# Patient Record
Sex: MALE | Race: BLACK OR AFRICAN AMERICAN | NOT HISPANIC OR LATINO | URBAN - METROPOLITAN AREA
[De-identification: names, ages, dates, MRNs, and addresses within clinical notes are randomized per-mention and may not be internally consistent; named-entity substitution may affect disease eponyms.]

---

## 2022-09-12 ENCOUNTER — EMERGENCY (EMERGENCY)
Facility: HOSPITAL | Age: 62
LOS: 1 days | Discharge: HOME | End: 2022-09-12
Admitting: EMERGENCY MEDICINE

## 2022-09-12 VITALS
SYSTOLIC BLOOD PRESSURE: 133 MMHG | HEIGHT: 72 IN | OXYGEN SATURATION: 96 % | HEART RATE: 71 BPM | WEIGHT: 227.96 LBS | TEMPERATURE: 98 F | DIASTOLIC BLOOD PRESSURE: 83 MMHG | RESPIRATION RATE: 16 BRPM

## 2022-09-12 VITALS
TEMPERATURE: 98 F | RESPIRATION RATE: 16 BRPM | DIASTOLIC BLOOD PRESSURE: 82 MMHG | HEART RATE: 51 BPM | OXYGEN SATURATION: 100 % | SYSTOLIC BLOOD PRESSURE: 128 MMHG

## 2022-09-12 LAB
ALBUMIN SERPL ELPH-MCNC: 3.9 G/DL — SIGNIFICANT CHANGE UP (ref 3.4–5)
ALP SERPL-CCNC: 65 U/L — SIGNIFICANT CHANGE UP (ref 40–120)
ALT FLD-CCNC: 27 U/L — SIGNIFICANT CHANGE UP (ref 12–42)
ANION GAP SERPL CALC-SCNC: 6 MMOL/L — LOW (ref 9–16)
APPEARANCE UR: CLEAR — SIGNIFICANT CHANGE UP
AST SERPL-CCNC: 19 U/L — SIGNIFICANT CHANGE UP (ref 15–37)
BASOPHILS # BLD AUTO: 0.02 K/UL — SIGNIFICANT CHANGE UP (ref 0–0.2)
BASOPHILS NFR BLD AUTO: 0.3 % — SIGNIFICANT CHANGE UP (ref 0–2)
BILIRUB SERPL-MCNC: 0.3 MG/DL — SIGNIFICANT CHANGE UP (ref 0.2–1.2)
BILIRUB UR-MCNC: NEGATIVE — SIGNIFICANT CHANGE UP
BUN SERPL-MCNC: 15 MG/DL — SIGNIFICANT CHANGE UP (ref 7–23)
CALCIUM SERPL-MCNC: 9.7 MG/DL — SIGNIFICANT CHANGE UP (ref 8.5–10.5)
CHLORIDE SERPL-SCNC: 106 MMOL/L — SIGNIFICANT CHANGE UP (ref 96–108)
CO2 SERPL-SCNC: 29 MMOL/L — SIGNIFICANT CHANGE UP (ref 22–31)
COLOR SPEC: YELLOW — SIGNIFICANT CHANGE UP
CREAT SERPL-MCNC: 1.16 MG/DL — SIGNIFICANT CHANGE UP (ref 0.5–1.3)
DIFF PNL FLD: NEGATIVE — SIGNIFICANT CHANGE UP
EGFR: 72 ML/MIN/1.73M2 — SIGNIFICANT CHANGE UP
EOSINOPHIL # BLD AUTO: 0.08 K/UL — SIGNIFICANT CHANGE UP (ref 0–0.5)
EOSINOPHIL NFR BLD AUTO: 1.2 % — SIGNIFICANT CHANGE UP (ref 0–6)
GLUCOSE SERPL-MCNC: 87 MG/DL — SIGNIFICANT CHANGE UP (ref 70–99)
GLUCOSE UR QL: NEGATIVE — SIGNIFICANT CHANGE UP
HCT VFR BLD CALC: 45.3 % — SIGNIFICANT CHANGE UP (ref 39–50)
HGB BLD-MCNC: 14.8 G/DL — SIGNIFICANT CHANGE UP (ref 13–17)
IMM GRANULOCYTES NFR BLD AUTO: 0.3 % — SIGNIFICANT CHANGE UP (ref 0–1.5)
KETONES UR-MCNC: NEGATIVE — SIGNIFICANT CHANGE UP
LEUKOCYTE ESTERASE UR-ACNC: NEGATIVE — SIGNIFICANT CHANGE UP
LYMPHOCYTES # BLD AUTO: 3.22 K/UL — SIGNIFICANT CHANGE UP (ref 1–3.3)
LYMPHOCYTES # BLD AUTO: 48.9 % — HIGH (ref 13–44)
MCHC RBC-ENTMCNC: 32 PG — SIGNIFICANT CHANGE UP (ref 27–34)
MCHC RBC-ENTMCNC: 32.7 GM/DL — SIGNIFICANT CHANGE UP (ref 32–36)
MCV RBC AUTO: 98.1 FL — SIGNIFICANT CHANGE UP (ref 80–100)
MONOCYTES # BLD AUTO: 0.6 K/UL — SIGNIFICANT CHANGE UP (ref 0–0.9)
MONOCYTES NFR BLD AUTO: 9.1 % — SIGNIFICANT CHANGE UP (ref 2–14)
NEUTROPHILS # BLD AUTO: 2.65 K/UL — SIGNIFICANT CHANGE UP (ref 1.8–7.4)
NEUTROPHILS NFR BLD AUTO: 40.2 % — LOW (ref 43–77)
NITRITE UR-MCNC: NEGATIVE — SIGNIFICANT CHANGE UP
NRBC # BLD: 0 /100 WBCS — SIGNIFICANT CHANGE UP (ref 0–0)
PH UR: 6 — SIGNIFICANT CHANGE UP (ref 5–8)
PLATELET # BLD AUTO: 370 K/UL — SIGNIFICANT CHANGE UP (ref 150–400)
POTASSIUM SERPL-MCNC: 3.9 MMOL/L — SIGNIFICANT CHANGE UP (ref 3.5–5.3)
POTASSIUM SERPL-SCNC: 3.9 MMOL/L — SIGNIFICANT CHANGE UP (ref 3.5–5.3)
PROT SERPL-MCNC: 8.1 G/DL — SIGNIFICANT CHANGE UP (ref 6.4–8.2)
PROT UR-MCNC: NEGATIVE MG/DL — SIGNIFICANT CHANGE UP
RBC # BLD: 4.62 M/UL — SIGNIFICANT CHANGE UP (ref 4.2–5.8)
RBC # FLD: 12.5 % — SIGNIFICANT CHANGE UP (ref 10.3–14.5)
SODIUM SERPL-SCNC: 141 MMOL/L — SIGNIFICANT CHANGE UP (ref 132–145)
SP GR SPEC: >=1.03 — SIGNIFICANT CHANGE UP (ref 1–1.03)
UROBILINOGEN FLD QL: 0.2 E.U./DL — SIGNIFICANT CHANGE UP
WBC # BLD: 6.59 K/UL — SIGNIFICANT CHANGE UP (ref 3.8–10.5)
WBC # FLD AUTO: 6.59 K/UL — SIGNIFICANT CHANGE UP (ref 3.8–10.5)

## 2022-09-12 PROCEDURE — 76870 US EXAM SCROTUM: CPT | Mod: 26

## 2022-09-12 PROCEDURE — 74176 CT ABD & PELVIS W/O CONTRAST: CPT | Mod: 26

## 2022-09-12 PROCEDURE — 99284 EMERGENCY DEPT VISIT MOD MDM: CPT

## 2022-09-12 RX ORDER — CEFTRIAXONE 500 MG/1
500 INJECTION, POWDER, FOR SOLUTION INTRAMUSCULAR; INTRAVENOUS ONCE
Refills: 0 | Status: COMPLETED | OUTPATIENT
Start: 2022-09-12 | End: 2022-09-12

## 2022-09-12 RX ORDER — KETOROLAC TROMETHAMINE 30 MG/ML
15 SYRINGE (ML) INJECTION ONCE
Refills: 0 | Status: DISCONTINUED | OUTPATIENT
Start: 2022-09-12 | End: 2022-09-12

## 2022-09-12 RX ORDER — SODIUM CHLORIDE 9 MG/ML
1000 INJECTION INTRAMUSCULAR; INTRAVENOUS; SUBCUTANEOUS ONCE
Refills: 0 | Status: COMPLETED | OUTPATIENT
Start: 2022-09-12 | End: 2022-09-12

## 2022-09-12 RX ADMIN — Medication 15 MILLIGRAM(S): at 16:25

## 2022-09-12 RX ADMIN — CEFTRIAXONE 500 MILLIGRAM(S): 500 INJECTION, POWDER, FOR SOLUTION INTRAMUSCULAR; INTRAVENOUS at 19:50

## 2022-09-12 RX ADMIN — SODIUM CHLORIDE 1000 MILLILITER(S): 9 INJECTION INTRAMUSCULAR; INTRAVENOUS; SUBCUTANEOUS at 16:25

## 2022-09-12 NOTE — ED PROVIDER NOTE - GENITOURINARY, MLM
+ttp along the R epididymis, no scrotal swelling, penis appear within normal limits [circumcised] w/o DC, no inguinal adenopathy (chaperon: Tony GAR)

## 2022-09-12 NOTE — ED ADULT NURSE NOTE - OBJECTIVE STATEMENT
62y male c/o right testicular pain radiating to his right flank. Pain initially started at his right flank, but now starts at right testicle and now radiates to right flank. Denies hx of kidney stones.

## 2022-09-12 NOTE — ED PROVIDER NOTE - PROGRESS NOTE DETAILS
+epididymitis on US  Will add and send off GC  D/t h/o MSM, will cover w/ 500mg IM CTX and DC w/ levofloxacin 500mg QD x 10d  PMD fu  Pt stable on DC

## 2022-09-12 NOTE — ED PROVIDER NOTE - OBJECTIVE STATEMENT
61 yo M, h/o HIV, w R sided scrotal pain x 2 weeks. Pt reports the pain feels like it will occasionally radiate to the RLQ. No associated n/v. Further denies fevers, chills, headaches, or urinary symptoms. Pt reports he is sexually active, MSM; recent STD testing found to be negative but patient endorses remote STI history [treated].

## 2022-09-12 NOTE — ED ADULT TRIAGE NOTE - NS ED NURSE BANDS TYPE
Consent: Written consent obtained. Risks include but not limited to lid/brow ptosis, bruising, swelling, diplopia, temporary effect, incomplete chemical denervation. Name band;

## 2022-09-12 NOTE — ED PROVIDER NOTE - PATIENT PORTAL LINK FT
You can access the FollowMyHealth Patient Portal offered by [facility] by registering at the following website: [email or web address]. By joining [facility]’s FollowMyHealth portal, you will also be able to view your health information using other applications (apps) compatible with our system.

## 2022-09-12 NOTE — ED PROVIDER NOTE - CLINICAL SUMMARY MEDICAL DECISION MAKING FREE TEXT BOX
61 yo M, h/o HIV, w R sided scrotal pain x 2 weeks. +RLQ and R SCROTAL ttp. Will r/o renal stone vs scrotal pathology w/ CT and US. Toradol for pain. Will send medical labs and reassess.

## 2022-09-12 NOTE — ED PROVIDER NOTE - NSFOLLOWUPINSTRUCTIONS_ED_ALL_ED_FT
Epididymitis    WHAT YOU NEED TO KNOW:    Epididymitis is inflammation of your epididymis. The epididymis is a coiled tube inside your scrotum. It stores and carries sperm from your testicles to your penis. Acute epididymitis lasts for 6 weeks or less. Chronic epididymitis lasts longer than 6 weeks.  Testes Epididymitis         DISCHARGE INSTRUCTIONS:    Return to the emergency department if:   •You have severe pain in your testicles.      •Your symptoms become worse even after you start treatment with medicine.      Call your doctor if:   •Your symptoms do not get better within 3 days of treatment or come back after treatment.      •You have a hot, red, tender area on your testicles.      •You have questions or concerns about your condition or care.      Medicines: You may need any of the following:  •Antibiotics may be given if epididymitis is caused by a bacterial infection.       •NSAIDs, such as ibuprofen, help decrease swelling, pain, and fever. This medicine is available with or without a doctor's order. NSAIDs can cause stomach bleeding or kidney problems in certain people. If you take blood thinner medicine, always ask if NSAIDs are safe for you. Always read the medicine label and follow directions. Do not give these medicines to children younger than 6 months without direction from a healthcare provider.      •Acetaminophen decreases pain and fever. It is available without a doctor's order. Ask how much to take and how often to take it. Follow directions. Read the labels of all other medicines you are using to see if they also contain acetaminophen, or ask your doctor or pharmacist. Acetaminophen can cause liver damage if not taken correctly.      •Prescription pain medicine may be given. Ask your healthcare provider how to take this medicine safely. Some prescription pain medicines contain acetaminophen. Do not take other medicines that contain acetaminophen without talking to your healthcare provider. Too much acetaminophen may cause liver damage. Prescription pain medicine may cause constipation. Ask your healthcare provider how to prevent or treat constipation.       •Take your medicine as directed. Contact your healthcare provider if you think your medicine is not helping or if you have side effects. Tell your provider if you are allergic to any medicine. Keep a list of the medicines, vitamins, and herbs you take. Include the amounts, and when and why you take them. Bring the list or the pill bottles to follow-up visits. Carry your medicine list with you in case of an emergency.      Self-care:   •Apply ice on your testicles for 15 to 20 minutes every hour or as directed. Use an ice pack, or put crushed ice in a plastic bag. Cover it with a towel. Ice helps prevent tissue damage and decreases swelling and pain.      •Rest in bed as directed. Elevate your scrotum when you sit or lie down to help reduce swelling and pain. You may be asked to do this by placing a rolled-up towel under your scrotum.      •Scrotal support may be recommended. An athletic supporter provides scrotal support and may make you more comfortable when you stand. Ask your provider how to use an athletic supporter.       •Do not lift heavy objects. You can make swelling worse if you lift heavy objects or strain.       Follow up with your doctor as directed: Write down your questions so you remember to ask them during your visits.

## 2022-09-13 LAB
C TRACH RRNA SPEC QL NAA+PROBE: SIGNIFICANT CHANGE UP
N GONORRHOEA RRNA SPEC QL NAA+PROBE: SIGNIFICANT CHANGE UP

## 2022-09-14 DIAGNOSIS — N50.82 SCROTAL PAIN: ICD-10-CM

## 2022-09-14 DIAGNOSIS — Z21 ASYMPTOMATIC HUMAN IMMUNODEFICIENCY VIRUS [HIV] INFECTION STATUS: ICD-10-CM

## 2022-09-14 DIAGNOSIS — N45.1 EPIDIDYMITIS: ICD-10-CM

## 2022-09-14 LAB
CULTURE RESULTS: NO GROWTH — SIGNIFICANT CHANGE UP
SPECIMEN SOURCE: SIGNIFICANT CHANGE UP

## 2023-11-14 ENCOUNTER — EMERGENCY (EMERGENCY)
Facility: HOSPITAL | Age: 63
LOS: 1 days | Discharge: HOME | End: 2023-11-14
Admitting: EMERGENCY MEDICINE
Payer: MEDICAID

## 2023-11-14 VITALS
RESPIRATION RATE: 17 BRPM | OXYGEN SATURATION: 98 % | DIASTOLIC BLOOD PRESSURE: 96 MMHG | HEART RATE: 55 BPM | TEMPERATURE: 98 F | SYSTOLIC BLOOD PRESSURE: 156 MMHG

## 2023-11-14 VITALS
TEMPERATURE: 99 F | RESPIRATION RATE: 16 BRPM | OXYGEN SATURATION: 98 % | WEIGHT: 229.94 LBS | DIASTOLIC BLOOD PRESSURE: 94 MMHG | SYSTOLIC BLOOD PRESSURE: 158 MMHG | HEART RATE: 61 BPM

## 2023-11-14 PROCEDURE — 99284 EMERGENCY DEPT VISIT MOD MDM: CPT

## 2023-11-14 RX ORDER — OXYCODONE AND ACETAMINOPHEN 5; 325 MG/1; MG/1
1 TABLET ORAL ONCE
Refills: 0 | Status: DISCONTINUED | OUTPATIENT
Start: 2023-11-14 | End: 2023-11-14

## 2023-11-14 RX ORDER — KETOROLAC TROMETHAMINE 30 MG/ML
30 SYRINGE (ML) INJECTION ONCE
Refills: 0 | Status: DISCONTINUED | OUTPATIENT
Start: 2023-11-14 | End: 2023-11-14

## 2023-11-14 RX ORDER — CYCLOBENZAPRINE HYDROCHLORIDE 10 MG/1
10 TABLET, FILM COATED ORAL ONCE
Refills: 0 | Status: COMPLETED | OUTPATIENT
Start: 2023-11-14 | End: 2023-11-14

## 2023-11-14 RX ORDER — CYCLOBENZAPRINE HYDROCHLORIDE 10 MG/1
1 TABLET, FILM COATED ORAL
Qty: 30 | Refills: 0
Start: 2023-11-14 | End: 2023-11-23

## 2023-11-14 RX ORDER — MORPHINE SULFATE 50 MG/1
1 CAPSULE, EXTENDED RELEASE ORAL
Qty: 8 | Refills: 0
Start: 2023-11-14 | End: 2023-11-15

## 2023-11-14 RX ORDER — GABAPENTIN 400 MG/1
1 CAPSULE ORAL
Qty: 40 | Refills: 0
Start: 2023-11-14 | End: 2023-11-23

## 2023-11-14 RX ORDER — IBUPROFEN 200 MG
1 TABLET ORAL
Qty: 40 | Refills: 0
Start: 2023-11-14 | End: 2023-11-23

## 2023-11-14 RX ORDER — ACETAMINOPHEN 500 MG
3 TABLET ORAL
Qty: 120 | Refills: 0
Start: 2023-11-14 | End: 2023-11-23

## 2023-11-14 RX ORDER — GABAPENTIN 400 MG/1
300 CAPSULE ORAL ONCE
Refills: 0 | Status: COMPLETED | OUTPATIENT
Start: 2023-11-14 | End: 2023-11-14

## 2023-11-14 RX ORDER — LIDOCAINE 4 G/100G
1 CREAM TOPICAL ONCE
Refills: 0 | Status: COMPLETED | OUTPATIENT
Start: 2023-11-14 | End: 2023-11-14

## 2023-11-14 RX ADMIN — GABAPENTIN 300 MILLIGRAM(S): 400 CAPSULE ORAL at 21:05

## 2023-11-14 RX ADMIN — OXYCODONE AND ACETAMINOPHEN 1 TABLET(S): 5; 325 TABLET ORAL at 21:06

## 2023-11-14 RX ADMIN — LIDOCAINE 1 PATCH: 4 CREAM TOPICAL at 21:05

## 2023-11-14 RX ADMIN — Medication 30 MILLIGRAM(S): at 21:05

## 2023-11-14 RX ADMIN — CYCLOBENZAPRINE HYDROCHLORIDE 10 MILLIGRAM(S): 10 TABLET, FILM COATED ORAL at 21:01

## 2023-11-14 NOTE — ED PROVIDER NOTE - PROGRESS NOTE DETAILS
pt feels significantly better after treatment advised to follow up with orthopedics return precautions given

## 2023-11-14 NOTE — ED ADULT NURSE NOTE - PRIMARY CARE PROVIDER
UNKNOWN Terbinafine Counseling: Patient counseling regarding adverse effects of terbinafine including but not limited to headache, diarrhea, rash, upset stomach, liver function test abnormalities, itching, taste/smell disturbance, nausea, abdominal pain, and flatulence.  There is a rare possibility of liver failure that can occur when taking terbinafine.  The patient understands that a baseline LFT and kidney function test may be required. The patient verbalized understanding of the proper use and possible adverse effects of terbinafine.  All of the patient's questions and concerns were addressed.

## 2023-11-14 NOTE — ED ADULT NURSE NOTE - CHIEF COMPLAINT QUOTE
Pt states right leg pain and numbness since Thursday after moving a dresser. Pt was seen at a hospital in [place] and told it was a pulled muscle. Pt states pain has not gotten any better. Ambulating with steady gait. Denies sx on that leg.

## 2023-11-14 NOTE — ED PROVIDER NOTE - PATIENT PORTAL LINK FT
sore throat
You can access the FollowMyHealth Patient Portal offered by [facility] by registering at the following website: [email or web address]. By joining [employer]’s FollowMyHealth portal, you will also be able to view your health information using other applications (apps) compatible with our system.

## 2023-11-14 NOTE — ED PROVIDER NOTE - CARE PROVIDERS DIRECT ADDRESSES
[email or web address]

## 2023-11-14 NOTE — ED PROVIDER NOTE - NS ED ROS FT
Review of Systems    Constitutional: (-) fever (-) weakness (-) diaphoresis   Eyes: (-) change in vision (-) phtophobia (-) eye pain  ENT: (-) sore throat (-) ear ache (-) nasal discharge  Cardiovascular: (-) chest pain  (-) palpitations  Respiratory: (-) SOB (-) cough   GI: (-) abdominal pain (-) N/V (-) diarrhea  Integumentary: (-) rash (-) redness   Neurological:  (-) focal deficit (-) altered mental status  Msk SEE HPI

## 2023-11-14 NOTE — ED ADULT TRIAGE NOTE - CHIEF COMPLAINT QUOTE
Pt states right leg pain since Thursday after moving a dresser. Pt was seen at a hospital in [place] and told it was a pulled muscle. Pt states pain has not gotten any better. Ambulating with steady gait. Denies sx on that leg. Pt states right leg pain and numbness since Thursday after moving a dresser. Pt was seen at a hospital in [place] and told it was a pulled muscle. Pt states pain has not gotten any better. Ambulating with steady gait. Denies sx on that leg.

## 2023-11-14 NOTE — ED PROVIDER NOTE - CARE PROVIDER_API CALL
Keanu Jones.  Neurosurgery  [address] [place] [address]  Phone: [phone]  Fax: [phone]  Follow Up Time:

## 2023-11-14 NOTE — ED PROVIDER NOTE - NSFOLLOWUPINSTRUCTIONS_ED_ALL_ED_FT
1)Take Ibuprofen 600 mg with flexril initially if not working then  2) Take Acetaminophen 925 mg and Gabapentin 300 mg if not working then  3) Take Morphine for severe pain and if other medications are not working    [facility]  [phone]      Back Pain    Back pain is very common in adults. The cause of back pain is rarely dangerous and the pain often gets better over time. The cause of your back pain may not be known and may include strain of muscles or ligaments, degeneration of the spinal disks, or arthritis. Occasionally the pain may radiate down your leg(s). Over-the-counter medicines to reduce pain and inflammation are often the most helpful. Stretching and remaining active frequently helps the healing process.     SEEK IMMEDIATE MEDICAL CARE IF YOU HAVE THE FOLLOWING SYMPTOMS: bowel or bladder control problems, unusual weakness or numbness in your arms or legs, nausea or vomiting, abdominal pain, fever, dizziness/lightheadedness.

## 2023-11-14 NOTE — ED PROVIDER NOTE - OBJECTIVE STATEMENT
62 yo m pmhx sig for htn, hld, cad here with acute onset of R sided low back pain raditing down the R leg with paresthesias with pain worse with hip movement and ambulation began 2 ago after pt attempted to lift a heavy object 2 d ago. Since then reports constant L sided low back pain radiating down the R leg worse with torso movement. Seen in [facility] started on nsaids ibuprofen 400 mg and robaxin 500 mg. No saddle anesthesias, difficulty urinating or defecating, fevers, ivdu, malignancy or h/o tb.    I have reviewed available current nursing and previous documentation of past medical, surgical, family, and/or social history.

## 2023-11-14 NOTE — ED PROVIDER NOTE - PHYSICAL EXAMINATION
Physical Exam    Vital Signs: I have reviewed the initial vital signs.  Constitutional: well-appearing, appears stated age  Cardiovascular: regular rate, regular rhythm, well-perfused extremities  Respiratory: unlabored respiratory effort, clear to auscultation bilaterally  Gastrointestinal: soft, non-tender abdomen, no pulsatile mass  Musculoskeletal: +R sided perilumbar ttp and full ROM in all joints able to ambulate and bare weight   Integumentary: warm, dry, no rash  Neurologic: awake, alert, cranial nerves II-XII grossly intact, extremities’ motor and sensory functions grossly intact

## 2023-11-14 NOTE — ED PROVIDER NOTE - CLINICAL SUMMARY MEDICAL DECISION MAKING FREE TEXT BOX
well appearing here with R sided lower back pain with lumbar radiculopathy, neurovascular intact and no evidence of cord compression, plan: pain control dc with ortho follow up

## 2023-11-15 PROBLEM — B20 HUMAN IMMUNODEFICIENCY VIRUS [HIV] DISEASE: Chronic | Status: ACTIVE | Noted: 2022-09-13

## 2023-11-15 RX ORDER — MORPHINE SULFATE 50 MG/1
1 CAPSULE, EXTENDED RELEASE ORAL
Qty: 8 | Refills: 0
Start: 2023-11-15 | End: 2023-11-16

## 2023-11-16 DIAGNOSIS — M54.16 RADICULOPATHY, LUMBAR REGION: ICD-10-CM
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
## 2023-11-16 DIAGNOSIS — R20.2 PARESTHESIA OF SKIN: ICD-10-CM

## 2023-11-16 DIAGNOSIS — M25.551 PAIN IN RIGHT HIP: ICD-10-CM

## 2023-11-16 DIAGNOSIS — M25.552 PAIN IN LEFT HIP: ICD-10-CM
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
## 2024-07-13 ENCOUNTER — INPATIENT (INPATIENT)
Facility: HOSPITAL | Age: 64
LOS: 0 days | Discharge: HOME | DRG: 916 | End: 2024-07-14
Attending: INTERNAL MEDICINE | Admitting: INTERNAL MEDICINE
Payer: COMMERCIAL

## 2024-07-13 VITALS
WEIGHT: 240.08 LBS | HEART RATE: 80 BPM | SYSTOLIC BLOOD PRESSURE: 132 MMHG | TEMPERATURE: 98 F | OXYGEN SATURATION: 98 % | DIASTOLIC BLOOD PRESSURE: 85 MMHG | RESPIRATION RATE: 19 BRPM

## 2024-07-13 LAB
ALBUMIN SERPL ELPH-MCNC: 3.1 G/DL — LOW (ref 3.4–5)
ALBUMIN SERPL ELPH-MCNC: 4 G/DL — SIGNIFICANT CHANGE UP (ref 3.3–5)
ALP SERPL-CCNC: 67 U/L — SIGNIFICANT CHANGE UP (ref 40–120)
ALP SERPL-CCNC: 73 U/L — SIGNIFICANT CHANGE UP (ref 40–120)
ALT FLD-CCNC: 16 U/L — SIGNIFICANT CHANGE UP (ref 10–45)
ALT FLD-CCNC: 17 U/L — SIGNIFICANT CHANGE UP (ref 12–42)
ANION GAP SERPL CALC-SCNC: 10 MMOL/L — SIGNIFICANT CHANGE UP (ref 5–17)
ANION GAP SERPL CALC-SCNC: 3 MMOL/L — LOW (ref 9–16)
APTT BLD: 32.9 SEC — SIGNIFICANT CHANGE UP (ref 24.5–35.6)
APTT BLD: 37 SEC — HIGH (ref 24.5–35.6)
AST SERPL-CCNC: 17 U/L — SIGNIFICANT CHANGE UP (ref 10–40)
AST SERPL-CCNC: 20 U/L — SIGNIFICANT CHANGE UP (ref 15–37)
BASOPHILS # BLD AUTO: 0.01 K/UL — SIGNIFICANT CHANGE UP (ref 0–0.2)
BASOPHILS NFR BLD AUTO: 0.1 % — SIGNIFICANT CHANGE UP (ref 0–2)
BILIRUB SERPL-MCNC: 0.2 MG/DL — SIGNIFICANT CHANGE UP (ref 0.2–1.2)
BILIRUB SERPL-MCNC: 0.3 MG/DL — SIGNIFICANT CHANGE UP (ref 0.2–1.2)
BLD GP AB SCN SERPL QL: NEGATIVE — SIGNIFICANT CHANGE UP
BUN SERPL-MCNC: 18 MG/DL — SIGNIFICANT CHANGE UP (ref 7–23)
BUN SERPL-MCNC: 20 MG/DL — SIGNIFICANT CHANGE UP (ref 7–23)
CALCIUM SERPL-MCNC: 8.9 MG/DL — SIGNIFICANT CHANGE UP (ref 8.5–10.5)
CALCIUM SERPL-MCNC: 9.5 MG/DL — SIGNIFICANT CHANGE UP (ref 8.4–10.5)
CHLORIDE SERPL-SCNC: 104 MMOL/L — SIGNIFICANT CHANGE UP (ref 96–108)
CHLORIDE SERPL-SCNC: 107 MMOL/L — SIGNIFICANT CHANGE UP (ref 96–108)
CK SERPL-CCNC: 303 U/L — SIGNIFICANT CHANGE UP (ref 39–308)
CO2 SERPL-SCNC: 24 MMOL/L — SIGNIFICANT CHANGE UP (ref 22–31)
CO2 SERPL-SCNC: 31 MMOL/L — SIGNIFICANT CHANGE UP (ref 22–31)
CREAT SERPL-MCNC: 0.78 MG/DL — SIGNIFICANT CHANGE UP (ref 0.5–1.3)
CREAT SERPL-MCNC: 0.96 MG/DL — SIGNIFICANT CHANGE UP (ref 0.5–1.3)
EGFR: 101 ML/MIN/1.73M2 — SIGNIFICANT CHANGE UP
EGFR: 89 ML/MIN/1.73M2 — SIGNIFICANT CHANGE UP
EOSINOPHIL # BLD AUTO: 0.03 K/UL — SIGNIFICANT CHANGE UP (ref 0–0.5)
EOSINOPHIL NFR BLD AUTO: 0.3 % — SIGNIFICANT CHANGE UP (ref 0–6)
GLUCOSE BLDC GLUCOMTR-MCNC: 151 MG/DL — HIGH (ref 70–99)
GLUCOSE SERPL-MCNC: 113 MG/DL — HIGH (ref 70–99)
GLUCOSE SERPL-MCNC: 124 MG/DL — HIGH (ref 70–99)
HCT VFR BLD CALC: 44.4 % — SIGNIFICANT CHANGE UP (ref 39–50)
HCT VFR BLD CALC: 45.6 % — SIGNIFICANT CHANGE UP (ref 39–50)
HGB BLD-MCNC: 14.7 G/DL — SIGNIFICANT CHANGE UP (ref 13–17)
HGB BLD-MCNC: 15 G/DL — SIGNIFICANT CHANGE UP (ref 13–17)
IMM GRANULOCYTES NFR BLD AUTO: 0.5 % — SIGNIFICANT CHANGE UP (ref 0–0.9)
INR BLD: 0.89 — SIGNIFICANT CHANGE UP (ref 0.85–1.18)
INR BLD: 0.9 — SIGNIFICANT CHANGE UP (ref 0.85–1.18)
LYMPHOCYTES # BLD AUTO: 1.67 K/UL — SIGNIFICANT CHANGE UP (ref 1–3.3)
LYMPHOCYTES # BLD AUTO: 16.3 % — SIGNIFICANT CHANGE UP (ref 13–44)
MAGNESIUM SERPL-MCNC: 2.2 MG/DL — SIGNIFICANT CHANGE UP (ref 1.6–2.6)
MCHC RBC-ENTMCNC: 31.6 PG — SIGNIFICANT CHANGE UP (ref 27–34)
MCHC RBC-ENTMCNC: 31.7 PG — SIGNIFICANT CHANGE UP (ref 27–34)
MCHC RBC-ENTMCNC: 32.9 GM/DL — SIGNIFICANT CHANGE UP (ref 32–36)
MCHC RBC-ENTMCNC: 33.1 GM/DL — SIGNIFICANT CHANGE UP (ref 32–36)
MCV RBC AUTO: 95.7 FL — SIGNIFICANT CHANGE UP (ref 80–100)
MCV RBC AUTO: 96 FL — SIGNIFICANT CHANGE UP (ref 80–100)
MONOCYTES # BLD AUTO: 0.2 K/UL — SIGNIFICANT CHANGE UP (ref 0–0.9)
MONOCYTES NFR BLD AUTO: 2 % — SIGNIFICANT CHANGE UP (ref 2–14)
NEUTROPHILS # BLD AUTO: 8.29 K/UL — HIGH (ref 1.8–7.4)
NEUTROPHILS NFR BLD AUTO: 80.8 % — HIGH (ref 43–77)
NRBC # BLD: 0 /100 WBCS — SIGNIFICANT CHANGE UP (ref 0–0)
NRBC # BLD: 0 /100 WBCS — SIGNIFICANT CHANGE UP (ref 0–0)
PLATELET # BLD AUTO: 314 K/UL — SIGNIFICANT CHANGE UP (ref 150–400)
PLATELET # BLD AUTO: 356 K/UL — SIGNIFICANT CHANGE UP (ref 150–400)
POTASSIUM SERPL-MCNC: 4 MMOL/L — SIGNIFICANT CHANGE UP (ref 3.5–5.3)
POTASSIUM SERPL-MCNC: 4.1 MMOL/L — SIGNIFICANT CHANGE UP (ref 3.5–5.3)
POTASSIUM SERPL-SCNC: 4 MMOL/L — SIGNIFICANT CHANGE UP (ref 3.5–5.3)
POTASSIUM SERPL-SCNC: 4.1 MMOL/L — SIGNIFICANT CHANGE UP (ref 3.5–5.3)
PROT SERPL-MCNC: 7.7 G/DL — SIGNIFICANT CHANGE UP (ref 6.4–8.2)
PROT SERPL-MCNC: 8.1 G/DL — SIGNIFICANT CHANGE UP (ref 6–8.3)
PROTHROM AB SERPL-ACNC: 10.2 SEC — SIGNIFICANT CHANGE UP (ref 9.5–13)
PROTHROM AB SERPL-ACNC: 10.2 SEC — SIGNIFICANT CHANGE UP (ref 9.5–13)
RBC # BLD: 4.64 M/UL — SIGNIFICANT CHANGE UP (ref 4.2–5.8)
RBC # BLD: 4.75 M/UL — SIGNIFICANT CHANGE UP (ref 4.2–5.8)
RBC # FLD: 12.7 % — SIGNIFICANT CHANGE UP (ref 10.3–14.5)
RBC # FLD: 12.9 % — SIGNIFICANT CHANGE UP (ref 10.3–14.5)
RH IG SCN BLD-IMP: POSITIVE — SIGNIFICANT CHANGE UP
SODIUM SERPL-SCNC: 138 MMOL/L — SIGNIFICANT CHANGE UP (ref 135–145)
SODIUM SERPL-SCNC: 141 MMOL/L — SIGNIFICANT CHANGE UP (ref 132–145)
TROPONIN I, HIGH SENSITIVITY RESULT: 4 NG/L — SIGNIFICANT CHANGE UP
WBC # BLD: 10.25 K/UL — SIGNIFICANT CHANGE UP (ref 3.8–10.5)
WBC # BLD: 11.19 K/UL — HIGH (ref 3.8–10.5)
WBC # FLD AUTO: 10.25 K/UL — SIGNIFICANT CHANGE UP (ref 3.8–10.5)
WBC # FLD AUTO: 11.19 K/UL — HIGH (ref 3.8–10.5)

## 2024-07-13 PROCEDURE — 71045 X-RAY EXAM CHEST 1 VIEW: CPT | Mod: 26

## 2024-07-13 PROCEDURE — 99222 1ST HOSP IP/OBS MODERATE 55: CPT | Mod: GC

## 2024-07-13 PROCEDURE — 99291 CRITICAL CARE FIRST HOUR: CPT

## 2024-07-13 RX ORDER — DEXTROSE MONOHYDRATE AND SODIUM CHLORIDE 5; .3 G/100ML; G/100ML
1000 INJECTION, SOLUTION INTRAVENOUS
Refills: 0 | Status: DISCONTINUED | OUTPATIENT
Start: 2024-07-13 | End: 2024-07-14

## 2024-07-13 RX ORDER — DIPHENHYDRAMINE HCL 12.5MG/5ML
50 ELIXIR ORAL EVERY 6 HOURS
Refills: 0 | Status: DISCONTINUED | OUTPATIENT
Start: 2024-07-13 | End: 2024-07-13

## 2024-07-13 RX ORDER — ASPIRIN 325 MG/1
324 TABLET, FILM COATED ORAL ONCE
Refills: 0 | Status: DISCONTINUED | OUTPATIENT
Start: 2024-07-13 | End: 2024-07-13

## 2024-07-13 RX ORDER — DEXTROSE 30 % IN WATER 30 %
12.5 VIAL (ML) INTRAVENOUS ONCE
Refills: 0 | Status: DISCONTINUED | OUTPATIENT
Start: 2024-07-13 | End: 2024-07-14

## 2024-07-13 RX ORDER — LORATADINE 10 MG/1
10 TABLET ORAL DAILY
Refills: 0 | Status: DISCONTINUED | OUTPATIENT
Start: 2024-07-13 | End: 2024-07-13

## 2024-07-13 RX ORDER — DIPHENHYDRAMINE HCL 12.5MG/5ML
50 ELIXIR ORAL EVERY 8 HOURS
Refills: 0 | Status: DISCONTINUED | OUTPATIENT
Start: 2024-07-13 | End: 2024-07-14

## 2024-07-13 RX ORDER — DEXAMETHASONE 1 MG/1
6 TABLET ORAL EVERY 8 HOURS
Refills: 0 | Status: DISCONTINUED | OUTPATIENT
Start: 2024-07-13 | End: 2024-07-13

## 2024-07-13 RX ORDER — FAMOTIDINE 40 MG
20 TABLET ORAL
Refills: 0 | Status: DISCONTINUED | OUTPATIENT
Start: 2024-07-13 | End: 2024-07-13

## 2024-07-13 RX ORDER — DEXTROSE 30 % IN WATER 30 %
15 VIAL (ML) INTRAVENOUS ONCE
Refills: 0 | Status: DISCONTINUED | OUTPATIENT
Start: 2024-07-13 | End: 2024-07-14

## 2024-07-13 RX ORDER — FAMOTIDINE 40 MG
20 TABLET ORAL
Refills: 0 | Status: DISCONTINUED | OUTPATIENT
Start: 2024-07-13 | End: 2024-07-14

## 2024-07-13 RX ORDER — EPINEPHRINE 0.3 MG/.3ML
0.3 INJECTION SUBCUTANEOUS ONCE
Refills: 0 | Status: COMPLETED | OUTPATIENT
Start: 2024-07-13 | End: 2024-07-13

## 2024-07-13 RX ORDER — METHYLPREDNISOLONE ACETATE 20 MG/ML
125 VIAL (ML) INJECTION ONCE
Refills: 0 | Status: COMPLETED | OUTPATIENT
Start: 2024-07-13 | End: 2024-07-13

## 2024-07-13 RX ORDER — FAMOTIDINE 40 MG
20 TABLET ORAL ONCE
Refills: 0 | Status: COMPLETED | OUTPATIENT
Start: 2024-07-13 | End: 2024-07-13

## 2024-07-13 RX ORDER — DIPHENHYDRAMINE HCL 12.5MG/5ML
50 ELIXIR ORAL ONCE
Refills: 0 | Status: COMPLETED | OUTPATIENT
Start: 2024-07-13 | End: 2024-07-13

## 2024-07-13 RX ORDER — DEXTROSE 30 % IN WATER 30 %
25 VIAL (ML) INTRAVENOUS ONCE
Refills: 0 | Status: DISCONTINUED | OUTPATIENT
Start: 2024-07-13 | End: 2024-07-14

## 2024-07-13 RX ORDER — FUROSEMIDE 10 MG/ML
20 INJECTION, SOLUTION INTRAMUSCULAR; INTRAVENOUS ONCE
Refills: 0 | Status: COMPLETED | OUTPATIENT
Start: 2024-07-13 | End: 2024-07-13

## 2024-07-13 RX ORDER — ENOXAPARIN SODIUM 100 MG/ML
40 INJECTION SUBCUTANEOUS EVERY 24 HOURS
Refills: 0 | Status: DISCONTINUED | OUTPATIENT
Start: 2024-07-13 | End: 2024-07-14

## 2024-07-13 RX ORDER — DEXAMETHASONE 1 MG/1
8 TABLET ORAL EVERY 8 HOURS
Refills: 0 | Status: DISCONTINUED | OUTPATIENT
Start: 2024-07-13 | End: 2024-07-14

## 2024-07-13 RX ORDER — SODIUM CHLORIDE 0.9 % (FLUSH) 0.9 %
1000 SYRINGE (ML) INJECTION ONCE
Refills: 0 | Status: COMPLETED | OUTPATIENT
Start: 2024-07-13 | End: 2024-07-13

## 2024-07-13 RX ORDER — GLUCAGON HYDROCHLORIDE 1 MG/ML
1 INJECTION, POWDER, FOR SOLUTION INTRAMUSCULAR; INTRAVENOUS; SUBCUTANEOUS ONCE
Refills: 0 | Status: DISCONTINUED | OUTPATIENT
Start: 2024-07-13 | End: 2024-07-14

## 2024-07-13 RX ADMIN — Medication 50 MILLIGRAM(S): at 21:23

## 2024-07-13 RX ADMIN — EPINEPHRINE 0.3 MILLIGRAM(S): 0.3 INJECTION SUBCUTANEOUS at 17:20

## 2024-07-13 RX ADMIN — Medication 1000 MILLILITER(S): at 18:16

## 2024-07-13 RX ADMIN — FUROSEMIDE 20 MILLIGRAM(S): 10 INJECTION, SOLUTION INTRAMUSCULAR; INTRAVENOUS at 18:13

## 2024-07-13 RX ADMIN — DEXAMETHASONE 8 MILLIGRAM(S): 1 TABLET ORAL at 22:25

## 2024-07-13 RX ADMIN — Medication 20 MILLIGRAM(S): at 22:25

## 2024-07-13 RX ADMIN — Medication 1000 MILLILITER(S): at 17:30

## 2024-07-13 RX ADMIN — Medication 20 MILLIGRAM(S): at 17:20

## 2024-07-13 RX ADMIN — Medication 125 MILLIGRAM(S): at 17:19

## 2024-07-13 NOTE — H&P ADULT - ASSESSMENT
Assessment: 64-year-old male presented to [facility] with lip swellingthat began this morning at 10AM after taking his bedtime lisinopril. Patient is being admitted for airway monitoring iso angioedema.    NEURO / PSYCH  #  ***    CARDIOVASCULAR  #  ***    PULM  #angioedema  Likely secondary to his lisinopril that he has been taking for years. Did not have drooling, stridor or trouble breathing.  [] Start Decardron 6mg q8h   []C/w Benadryl standing  []Start Claritin standing  []C/w Famotidine standing  [] ENT consult      GI  #  ***      #  ***    ENDO  #  ***    RENAL  #  ***    HEME  #  ***    ID  #leukocytosis  May be reactive iso angioedema  []CTM    #HIV  ***    MSK  #  ***    PROPHYLAXIS  F: Replete as needed  E: Replete to K > 4, Mg > 2  N: ---  GI PPX: ---  VTE PPX: ---  Access: ---  Code status: ---   Assessment: 64-year-old male presented to [facility] with lip swellingthat began this morning at 10AM after taking his bedtime lisinopril. Patient is being admitted for airway monitoring iso angioedema.    NEURO / PSYCH  NA    CARDIOVASCULAR  #chest pain  -high sens trop 4(wnl)  []f/u ecg    PULM  #angioedema  Likely secondary to his lisinopril that he has been taking for years. Did not have drooling, stridor, voice change or trouble breathing.  [] Start Decardron 6mg q8h   []C/w Benadryl standing  []Start Claritin standing  []C/w Famotidine standing  [] ENT: epiglottis , base of tongue edema but vocal cords seen->NPO and continue pulse ox monitoring  []ENT re-scope in AM      GI  NPO given epiglottis edema  [] f/u AM  ENT scope      NA    ENDO  NA    RENAL  NA    HEME  NA    ID  #leukocytosis  Reactive iso angioedema.   -10.25<-11.19  []CTM    #HIV  Medication compliant  -home:    MSK  NA    PROPHYLAXIS  F: Replete as needed  E: Replete to K > 4, Mg > 2  GI PPX: pepcid  VTE PPX: scd  Access: peripheral IV  Code status: full code   Assessment: 64-year-old male presented to [facility] with lip swellingthat began this morning at 10AM after taking his bedtime lisinopril. Patient is being admitted for airway monitoring iso angioedema.    NEURO / PSYCH  NA    CARDIOVASCULAR  #chest pain  #hx MI s/p stent  -high sens trop 4(wnl)  -home: aspirin 81 mg  []f/u ecg    PULM  #angioedema  Likely secondary to his lisinopril that he has been taking for years. Did not have drooling, stridor, voice change or trouble breathing.  [] Start Decardron 8mg q8h   []C/w Benadryl Q8   []Claritin 10 mg daily  []C/w Famotidine 20 mg IV   [] per ENT:  ·	base of tongue fullness, moderate epiglottic edema, and moderate arytenoid edema bilaterally obscuring the posterior aspects of the vocal cords, however, remainder of VC visible and fully mobile.   ·	NPO and continue pulse ox monitoring  []ENT re-scope in AM      GI  NPO given epiglottis edema  [] f/u AM  ENT scope    #HLD  -home: atorvastatin 30 mg daily      NA    ENDO  #pre-diabetes  -can't recall home medication he is on for weight loss and pre-diabetes    RENAL  NA    HEME  NA    ID  #leukocytosis  Reactive iso angioedema.   -10.25<-11.19  []CTM    #HIV  Medication compliant  -home: unknown  [] f/u cd4 count, viral load  []f/u [facility]    MSK  NA    PROPHYLAXIS  F: Replete as needed  E: Replete to K > 4, Mg > 2  GI PPX: pepcid  VTE PPX: scd  Access: peripheral IV  Code status: full code

## 2024-07-13 NOTE — ED ADULT NURSE REASSESSMENT NOTE - NS ED NURSE REASSESS COMMENT FT1
Dr marcelino approached me to say that iv lasix was written up on wrong pt,  spoke with pt, I followed in and apologised to pt , nurse cesilia salcedo aware

## 2024-07-13 NOTE — H&P ADULT - TIME BILLING
I have spent 57 minutes of time on the encounter which excludes teaching and separately reported services

## 2024-07-13 NOTE — H&P ADULT - NSHPLABSRESULTS_GEN_ALL_CORE
LABS:                       15.0   11.19 )-----------( 356      ( 13 Jul 2024 17:14 )             45.6     07-13    141  |  107  |  20  ----------------------------<  113<H>  4.1   |  31  |  0.96    Ca    8.9      13 Jul 2024 17:50    TPro  7.7  /  Alb  3.1<L>  /  TBili  0.3  /  DBili  x   /  AST  20  /  ALT  17  /  AlkPhos  67  07-13    PT/INR - ( 13 Jul 2024 17:14 )   PT: 10.2 sec;   INR: 0.90          PTT - ( 13 Jul 2024 17:14 )  PTT:37.0 sec  Urinalysis Basic - ( 13 Jul 2024 17:50 )    Color: x / Appearance: x / SG: x / pH: x  Gluc: 113 mg/dL / Ketone: x  / Bili: x / Urobili: x   Blood: x / Protein: x / Nitrite: x   Leuk Esterase: x / RBC: x / WBC x   Sq Epi: x / Non Sq Epi: x / Bacteria: x

## 2024-07-13 NOTE — CONSULT NOTE ADULT - ASSESSMENT
-------------------------------  ASSESSMENT/PLAN:    IMPRESSION: WENDI DALEY  is a 64y Male with PMH HIV, HTN on lisinopril presents w/ lip swelling after taking lisinopril. AFVSS on RA w/o increased WOB or stridor w/ laryngoscopy demonstrating base of tongue fullness, moderate epiglottic edema, and moderate arytenoid edema bilaterally obscuring the posterior aspects of the vocal cords, however, VC visible and fully mobile.     RECOMMENDATIONS:  - NPO  - Continue angioedema protocol - famotidine, benadryl, steroids  - Head of bed elevation  - Continuous pulse ox  - Presently intubatable w/ glidescope   - ENT to rescope in the morning, please call if develops increased WOB, increased oxygen requirements    ---  Thank you for the kind referral and for allowing me to share in the care of WENDI DALEY If you have any questions, please do not hesitate to contact me.     Sincerely,  Norma Cook  07-13-24 @ 21:11     -------------------------------  ASSESSMENT/PLAN:    IMPRESSION: WENDI DALEY  is a 64y Male with PMH HIV, HTN on lisinopril presents w/ angioedema after taking lisinopril. AFVSS on RA w/o increased WOB or stridor w/ laryngoscopy demonstrating base of tongue fullness, moderate epiglottic edema, and moderate arytenoid edema bilaterally obscuring the posterior aspects of the vocal cords, however, remainder of VC visible and fully mobile.     RECOMMENDATIONS:  - NPO  - Continue angioedema protocol - famotidine, benadryl, steroids  - Head of bed elevation  - Continuous pulse ox  - Presently intubatable w/ glidescope   - ENT to rescope in the morning, please call if develops increased WOB, increased oxygen requirements    ---  Thank you for the kind referral and for allowing me to share in the care of WENDI DALEY If you have any questions, please do not hesitate to contact me.     Sincerely,  Norma Cook  07-13-24 @ 21:11     -------------------------------  ASSESSMENT/PLAN:    IMPRESSION: WENDI DALEY  is a 64y Male with PMH HIV, HTN on lisinopril presents w/ angioedema after taking lisinopril. AFVSS on RA w/o increased WOB or stridor w/ laryngoscopy demonstrating base of tongue fullness, moderate epiglottic edema, and moderate arytenoid edema bilaterally obscuring the posterior aspects of the vocal cords, however, remainder of VC visible and fully mobile.     RECOMMENDATIONS:  - NPO  - Continue angioedema protocol - famotidine, benadryl, steroids  - Head of bed elevation  - Continuous pulse ox  - Presently intubatable w/ glidescope   - ENT to rescope in the morning, please call if develops increased WOB, stridor, increased oxygen requirements    ---  Thank you for the kind referral and for allowing me to share in the care of WENDI DALEY If you have any questions, please do not hesitate to contact me.     Sincerely,  Norma Cook  07-13-24 @ 21:11

## 2024-07-13 NOTE — H&P ADULT - HISTORY OF PRESENT ILLNESS
64-year-old male presented to [facility] with lip angioedema that began this morning at 10AM. Pt states he took Lisinopril on 7/12 at bedtime and woke up this morning with swelling to his lips. Pt has been on Lisinopril for years. No drooling, no stridor, no distress in the ED.     ROS:  Negative unless otherwise stated above.        VITAL SIGNS:  Vital Signs Last 24 Hrs  T(C): 36.7 (13 Jul 2024 17:03), Max: 36.7 (13 Jul 2024 17:03)  T(F): 98 (13 Jul 2024 17:03), Max: 98 (13 Jul 2024 17:03)  HR: 79 (13 Jul 2024 18:17) (79 - 80)  BP: 156/85 (13 Jul 2024 18:17) (132/85 - 156/85)  BP(mean): --  RR: 18 (13 Jul 2024 18:17) (18 - 19)  SpO2: 98% (13 Jul 2024 18:17) (98% - 98%)    Parameters below as of 13 Jul 2024 18:17  Patient On (Oxygen Delivery Method): room air      INPATIENT MEDICATIONS:   MEDICATIONS  (STANDING):    MEDICATIONS  (PRN):    HOME MEDICATIONS  acetaminophen 325 mg oral tablet: 3 tab(s) orally every 6 hours To be taken with Gabapentin MDD: 12 tabs  cyclobenzaprine 10 mg oral tablet: 1 tab(s) orally 3 times a day as needed for  muscle spasm  gabapentin 300 mg oral capsule: 1 cap(s) orally 4 times a day as needed for  moderate pain to be taken with Tylenol for moderate pain  ibuprofen 600 mg oral tablet: 1 tab(s) orally every 6 hours as needed for  mild pain  levoFLOXacin 500 mg oral tablet: 1 tab(s) orally once a day   morphine 15 mg oral tablet: 1 tab(s) orally every 6 hours as needed for  severe pain TAKE ONLY FOR SEVERE PAIN IF UNTREATED BY OTHER PAIN MEDICATIONS PROSCRIBED, !HIGH ADDICTION POTENTIAL! MDD: 4    ALLERGIES:  Allergies    No Known Allergies    Intolerances       64-year-old male w/ PMHx MI s/p stent 10 years ago, HTN, HLD, HIV presented to [facility] with lip swelling that began this morning at 10AM. Pt states he took Lisinopril on 7/12 at bedtime and woke up this morning with swelling to his lips.No drooling, no stridor, no distress, voice change, difficulty swallowing in the ED. Pt has been on Lisinopril for years and reports this had happened 1.5 months prior and he took a benadryl with relief. He does not have any known similar familial reaction.    ED COURSE  -epinephrine 1mg/ml injectable, .3 mg IM, benadryl 50 mg IV, pepcid 20 mg IV and solumedrol 125 mg IV was given   -lasix given as an error    Upon arrival to the unit the patient has improved lip swelling with no change in his voice, no drooling, no stridor, no WOB, no SOB. He reports new left sided substernal chest pain that is 5/10 and not exertional.  He reports drinking 2 vodka/grapefruit juice mixed drinks at night but does not feel tremulous between drinks. Denies hx seizures or alcohol withdrawal that resulted in intubation in the past. Last alcoholic beverage was last night 7/12. Smokes marijuana twice a week.  ROS:  Negative unless otherwise stated above.        VITAL SIGNS:  Vital Signs Last 24 Hrs  T(C): 36.7 (13 Jul 2024 17:03), Max: 36.7 (13 Jul 2024 17:03)  T(F): 98 (13 Jul 2024 17:03), Max: 98 (13 Jul 2024 17:03)  HR: 79 (13 Jul 2024 18:17) (79 - 80)  BP: 156/85 (13 Jul 2024 18:17) (132/85 - 156/85)  BP(mean): --  RR: 18 (13 Jul 2024 18:17) (18 - 19)  SpO2: 98% (13 Jul 2024 18:17) (98% - 98%)    Parameters below as of 13 Jul 2024 18:17  Patient On (Oxygen Delivery Method): room air      INPATIENT MEDICATIONS:   MEDICATIONS  (STANDING):    MEDICATIONS  (PRN):    HOME MEDICATIONS  acetaminophen 325 mg oral tablet: 3 tab(s) orally every 6 hours To be taken with Gabapentin MDD: 12 tabs  cyclobenzaprine 10 mg oral tablet: 1 tab(s) orally 3 times a day as needed for  muscle spasm  gabapentin 300 mg oral capsule: 1 cap(s) orally 4 times a day as needed for  moderate pain to be taken with Tylenol for moderate pain  ibuprofen 600 mg oral tablet: 1 tab(s) orally every 6 hours as needed for  mild pain  levoFLOXacin 500 mg oral tablet: 1 tab(s) orally once a day   morphine 15 mg oral tablet: 1 tab(s) orally every 6 hours as needed for  severe pain TAKE ONLY FOR SEVERE PAIN IF UNTREATED BY OTHER PAIN MEDICATIONS PROSCRIBED, !HIGH ADDICTION POTENTIAL! MDD: 4    ALLERGIES:  Allergies    No Known Allergies    Intolerances

## 2024-07-13 NOTE — ED PROVIDER NOTE - CRITICAL CARE ATTENDING CONTRIBUTION TO CARE
acute angioedema, on lisinopril, worsening since 10am,   epinephrine, Pepcid, diphenhydramine and solu-medrol given, patient improving, d/w Dr Weeks ICU attending and will admit to stepdown

## 2024-07-13 NOTE — H&P ADULT - NSHPPHYSICALEXAM_GEN_ALL_CORE
PHYSICAL EXAM:  General: Alert and oriented x 3. No acute distress.   HEENT: Moist mucous membranes. Anicteric. No cervical lymphadenopathy.  Cardiovascular: Regular rate and rhythm. No murmur. Normal JVP.  Lungs: Clear to auscultation bilaterally. No accessory muscle use.  Abdomen: Soft, non-tender and non-distended. No palpable masses.  Extremities: No edema. Non-tender. Warm.  Skin: No rashes or lesions.   Neurologic: No apparent focal neurological deficits. CN II-XII grossly intact, but not individually tested.  Psychiatric: Cooperative. Appropriate mood and affect. PHYSICAL EXAM:  General: Alert and oriented x 3. No acute distress.   HEENT: Moist mucous membranes. Anicteric. No cervical lymphadenopathy. lip swelling   Cardiovascular: Regular rate and rhythm. No murmur. Normal JVP.  Lungs: Clear to auscultation bilaterally. No accessory muscle use.  Abdomen: Soft, non-tender and non-distended. No palpable masses.  Extremities: No edema. Non-tender. Warm.  Skin: No rashes or lesions.   Neurologic: No apparent focal neurological deficits. CN II-XII grossly intact, but not individually tested.  Psychiatric: Cooperative. Appropriate mood and affect.

## 2024-07-13 NOTE — ED ADULT NURSE REASSESSMENT NOTE - NS ED NURSE REASSESS COMMENT FT1
Pt left the er without getting last vitals signs from transport team, rn uptown in 7 l[facility]man aware pt received iv lasix that wasn't meant for him

## 2024-07-13 NOTE — H&P ADULT - ATTENDING COMMENTS
64 M with MI (stent 10 years ago), HTN, HLD, HIV presents with lip swelling upon awakening. He took lisinopril on 7/12 at bedtime. No drooling, no stridor, no distress, voice change, difficulty swallowing noted. Physical exam as above. ENT endoscopy showed: base of tongue fullness, moderate epiglottic edema, and moderate arytenoid edema bilaterally obscuring the posterior aspects of the vocal cords, however, remainder of VC visible and fully mobile.   1. Angioedema due to ACEI  2. HTN  3. HIV  - stop lisinopril  - angioedema protocol

## 2024-07-13 NOTE — ED ADULT NURSE NOTE - OBJECTIVE STATEMENT
Pt walk in with significant angioedema 2/2 ACE-inhibitor ingestion. Took meds last night then this AM, woke up with mild angioedema this am that has gotten progressively worse thruout the day. +uvula deviation. Tolerating secretions. No stridor, wheezing.

## 2024-07-13 NOTE — ED ADULT NURSE REASSESSMENT NOTE - NS ED NURSE REASSESS COMMENT FT1
Pt care handed over to myself, introduced self to patient, feeling "much better sleepy" from benadryl, given lasix  and urinal , awaiting aspirin from pharmacy as have ran out , pharmacy aware

## 2024-07-13 NOTE — CONSULT NOTE ADULT - SUBJECTIVE AND OBJECTIVE BOX
OTOLARYNGOLOGY (ENT) CONSULTATION NOTE    PATIENT: WENDI DALEY     MRN: [id number]       : 64  DATE OF ADMISSION:24  DATE OF SERVICE:  24 @ 21:10    CHIEF COMPLAINT: lip swelling    HISTORY OF PRESENT ILLNESS:  WENDI DALEY  is a 64y Male with history of HIV, HTN, HLD, on lisinopril long term, presents with lip swelling that started around 2-3am. He reports he took his lisinopril last night around midnight and a few hours later developed lip swelling, when he woke up this morning it had progressively worsened and continued to do so until he went to the ED around 5pm. There, he received steroids/benadryl/pepcid/epi and was transferred to [facility] for further evaluation/observation. He reports his lip swelling has been improving since he received treatment at [facility]. He denies pain with swallowing, voice changes, shortness of breath/difficulty breathing. He denies previous history of angioedema. He does report one episode of lip swelling after taking lisinopril that resolved with benadryl at home. He denies family history of angioedema and has no known drug allergies.    PAST MEDICAL HISTORY:  HIV disease    Hypertension    High blood cholesterol        CURRENT MEDICATIONS   loratadine 10 Oral      HOME MEDICATIONS:      ALLERGIES:  No Known Allergies    SOCIAL HISTORY: Pertinent included in HPI   FAMILY HISTORY      SURGICAL HISTORY:      REVIEW OF SYSTEMS: The patient was asked and responded to a review of systems regarding the following symptoms: constitutional, eye, ears, nose, mouth, throat, cardiovascular, respiratory. Pertinent factors have been included in the HPI.       PHYSICAL EXAM:  General: NAD, A+Ox3  Respiratory: No respiratory distress, stridor, or stertor on room air  Voice quality: normal  Face:  Symmetric, left sided buccal fullness, upper lip edema L>R, lower lip edema present but less significant than upper lip  Right: Pinna wnl  Left: Pinna wnl  Nose: nasal cavity clear bilaterally, inferior turbinates normal, mucosa normal without crusting or bleeding  OC/OP: tongue normal, floor of mouth WNL and soft, no masses or lesions, uvula midline w/o edema, 2+ tonsils  Neck: soft/flat, no LAD    LARYNGOSCOPY EXAM:     Verbal consent was obtained from patient prior to procedure.    Indication: angioedema    Flexible laryngoscopy was performed and revealed the following:    Nasopharynx had no mass or exudate.    Base of tongue symmetric and edematous bilaterally    Epiglottis omega shaped and w/ moderate edema    Moderate edema of the bilateral arytenoids (R>L) partially obscuring the posterior aspect of the BL VC    BL VC visible and fully mobile    Mild post cricoid edema    The patient tolerated the procedure well.        Vital Signs:  T(C): 36.7 (24 @ 17:03), Max: 36.7 (24 @ 17:03)  HR: 66 (24 @ 20:18) (66 - 80)  BP: 130/76 (24 @ 20:18) (130/76 - 156/85)  RR: 16 (24 @ 20:18) (16 - 19)  SpO2: 97% (24 @ 20:18) (97% - 98%)                        14.7   10.25 )-----------( 314      ( 2024 20:22 )             44.4        141  |  107  |  20  ----------------------------<  113<H>  4.1   |  31  |  0.96    Ca    8.9      2024 17:50    TPro  7.7  /  Alb  3.1<L>  /  TBili  0.3  /  DBili  x   /  AST  20  /  ALT  17  /  AlkPhos  67  07-13   PT/INR - ( 2024 20:22 )   PT: 10.2 sec;   INR: 0.89          PTT - ( 2024 20:22 )  PTT:32.9 [id number]    MICROBIOLOGY:      PATHOLOGY:

## 2024-07-13 NOTE — ED ADULT NURSE NOTE - NSFALLRISKINTERV_ED_ALL_ED

## 2024-07-13 NOTE — ED PROVIDER NOTE - OBJECTIVE STATEMENT
Summary : The patient reported lip swelling since 10 AM and provided information about their current medications.  - Chief Complaint (CC) : Lip swelling since 10 AM.  - History of Present Illness (HPI) :  Chief Complaint: Lip swelling since 10 AM.  Onset of Symptoms: 10 AM on the day of the visit.  Characterization of the Discomfort: Lip swelling.  Duration: Since 10 AM.  Location: Lips.  Severity: Not specified.  Aggravating Factors: Not specified.  Relieving Factors: Not specified.  Associated Signs and Symptoms: None mentioned.  Temporal Factors: Not specified.  Context: Not specified.  Associated Symptoms: None mentioned.  Severity and Impact: Not specified.  Comparative Analysis: Not specified.  Patient's Medical Regimen: Lisinopril 30 mg, HIV medications, Wegovy (weight loss medication).  Contextual Factors: Not specified.  Recent Medical Interventions: None mentioned.  - Past Medical History : Not specified.  - Past Surgical History : No history of surgeries or operations.  - Family History : Not specified.  - Social History :  Occupation: Not specified.  Education: Not specified.  Living Situation: Not specified.  Marital Status: Not specified.  Lifestyle and Habits: Smokes marijuana but not cigarettes.  Alcohol Use: Not specified.  Smoking: Smokes marijuana but not cigarettes.  Substance Use: Smokes marijuana.  Sexual History: Not specified.  Travel History: Not specified.  Cultural, Congregation, or Spiritual Beliefs: Not specified.  Social Support and Community Involvement: Not specified.  Hobbies and Recreational Activities: Not specified.  Diet: Not specified.  Exercise: Not specified.  Sleep Patterns: Not specified.  - Review of Systems : No specific review of systems mentioned.  - Medications :  - Lisinopril 30 mg    - HIV medications    - Wegovy (weight loss medication)    - Allergies : No allergies to medications mentioned.  Objective:  - Diagnostic Results : No diagnostic results mentioned.  - Vital Signs : Not specified.  - Physical Examination (PE) : No physical examination findings mentioned.  Assessment:  - Summary : The patient presented with lip swelling since 10 AM, which could be a potential side effect or allergic reaction to their current medications, including lisinopril, HIV medications, or Wegovy.  - Problems :  - Lip swelling    - Differential Diagnosis :  - Angioedema (potential side effect of lisinopril)    - Allergic reaction to medications    - Other potential causes of lip swelling (e.g., insect bite, infection, trauma)    Plan:  - Summary : The plan involves evaluating the potential causes of the lip swelling, considering medication adjustments or discontinuation, and providing appropriate treatment or referral based on the assessment.  - Plan : This appears to be angioedema related to lisinopril - epinephrine, Benadryl, Pepcid and solu-medrol given, placed on monitor and pulseox

## 2024-07-13 NOTE — H&P ADULT - PATIENT'S SEXUAL ORIENTATION
Women's Surgical Discharge Instructions    Medication ordered: Prescription given    Immunizations:  Most Recent Immunizations   Administered Date(s) Administered   • Diphenhydramine Hcl 11/09/2010   • Influenza 11/03/2015   • Influenza Quadrivalent Preservative Free 11/03/2014   • Ondansetron Hcl Inj 10/26/2010   • Pneumococcal Polysaccharide Adult 11/30/2006   • Tdap 09/29/2009   Deferred Date(s) Deferred   • Pneumococcal Polysaccharide Adult 07/15/2017       Instructions:   Bathing: You may shower as needed, no bath, hot tubs, or pools until discussed with your physician  Driving: According to your physicians instructions  Return to work/ school: According to your physicians instructions  Lifting/Bending: Lift nothing heavier than 10 pounds until discussed with your physician.  Bend at the knees, not the hips.  Stair Climbing: Limit, combine trips if possible  Resuming Sexual Activity: According to your physician  House work:Limit for the first two weeks  Exercise: Walking is fine, check with your physician for further restrictions  Other: No douching or tampon use until discussed with your physician        If you experience minor discomfort, you may take and over-the-counter pain reliever or other medications as directed by your physician.      Special instructions for your care at home:     Notify your doctor if any of the following occur:    · Increased or bright red drainage  · If you soak more than 1 regular pad an hour for 2 hours, call immediately  · Call immediately if bleeding is more than your doctor told you to expect  · You have a temperature above 100.4 degrees F  · You notice hard, red, warm or painful areas in your legs  · You have difficulty going to the bathroom  · You notice swelling, drainage, warmth or tenderness at your incision  · You have chest pain, difficulty breathing, or cough up blood    Help after you leave the hospital:    Ideas for help at home: friends, family members, neighbors,  and members of your viviane community are all there to help you.    Contact your doctor if you have any questions or problems.    Hydrocodone Bitartrate, Acetaminophen Oral tablet  What is this medicine?  ACETAMINOPHEN; HYDROCODONE (a set a JALIL angela fen; shobha droe KOE done) is a pain reliever. It is used to treat moderate to severe pain.  This medicine may be used for other purposes; ask your health care provider or pharmacist if you have questions.  What should I tell my health care provider before I take this medicine?  They need to know if you have any of these conditions:  · brain tumor  · Crohn's disease, inflammatory bowel disease, or ulcerative colitis  · drug abuse or addiction  · head injury  · heart or circulation problems  · if you often drink alcohol  · kidney disease or problems going to the bathroom  · liver disease  · lung disease, asthma, or breathing problems  · an unusual or allergic reaction to acetaminophen, hydrocodone, other opioid analgesics, other medicines, foods, dyes, or preservatives  · pregnant or trying to get pregnant  · breast-feeding  How should I use this medicine?  Take this medicine by mouth. Swallow it with a full glass of water. Follow the directions on the prescription label. If the medicine upsets your stomach, take the medicine with food or milk. Do not take more than you are told to take.  Talk to your pediatrician regarding the use of this medicine in children. This medicine is not approved for use in children.  Patients over 65 years may have a stronger reaction and need a smaller dose.  Overdosage: If you think you have taken too much of this medicine contact a poison control center or emergency room at once.  NOTE: This medicine is only for you. Do not share this medicine with others.  What if I miss a dose?  If you miss a dose, take it as soon as you can. If it is almost time for your next dose, take only that dose. Do not take double or extra doses.  What may interact with  this medicine?  · alcohol  · antihistamines  · isoniazid  · medicines for depression, anxiety, or psychotic disturbances  · medicines for sleep  · muscle relaxants  · naltrexone  · narcotic medicines (opiates) for pain  · phenobarbital  · ritonavir  · tramadol  This list may not describe all possible interactions. Give your health care provider a list of all the medicines, herbs, non-prescription drugs, or dietary supplements you use. Also tell them if you smoke, drink alcohol, or use illegal drugs. Some items may interact with your medicine.  What should I watch for while using this medicine?  Tell your doctor or health care professional if your pain does not go away, if it gets worse, or if you have new or a different type of pain. You may develop tolerance to the medicine. Tolerance means that you will need a higher dose of the medicine for pain relief. Tolerance is normal and is expected if you take the medicine for a long time.  Do not suddenly stop taking your medicine because you may develop a severe reaction. Your body becomes used to the medicine. This does NOT mean you are addicted. Addiction is a behavior related to getting and using a drug for a non-medical reason. If you have pain, you have a medical reason to take pain medicine. Your doctor will tell you how much medicine to take. If your doctor wants you to stop the medicine, the dose will be slowly lowered over time to avoid any side effects.  You may get drowsy or dizzy when you first start taking the medicine or change doses. Do not drive, use machinery, or do anything that may be dangerous until you know how the medicine affects you. Stand or sit up slowly.  There are different types of narcotic medicines (opiates) for pain. If you take more than one type at the same time, you may have more side effects. Give your health care provider a list of all medicines you use. Your doctor will tell you how much medicine to take. Do not take more medicine  than directed. Call emergency for help if you have problems breathing.  The medicine will cause constipation. Try to have a bowel movement at least every 2 to 3 days. If you do not have a bowel movement for 3 days, call your doctor or health care professional.  Too much acetaminophen can be very dangerous. Do not take Tylenol (acetaminophen) or medicines that contain acetaminophen with this medicine. Many non-prescription medicines contain acetaminophen. Always read the labels carefully.  What side effects may I notice from receiving this medicine?  Side effects that you should report to your doctor or health care professional as soon as possible:  · allergic reactions like skin rash, itching or hives, swelling of the face, lips, or tongue  · breathing problems  · confusion  · feeling faint or lightheaded, falls  · stomach pain  · yellowing of the eyes or skin  Side effects that usually do not require medical attention (report to your doctor or health care professional if they continue or are bothersome):  · nausea, vomiting  · stomach upset  This list may not describe all possible side effects. Call your doctor for medical advice about side effects. You may report side effects to FDA at 3-812-FDA-9911.  Where should I keep my medicine?  Keep out of the reach of children. This medicine can be abused. Keep your medicine in a safe place to protect it from theft. Do not share this medicine with anyone. Selling or giving away this medicine is dangerous and against the law.  Store at room temperature between 15 and 30 degrees C (59 and 86 degrees F). Protect from light. Keep container tightly closed.  Throw away any unused medicine after the expiration date. Discard unused medicine and used packaging carefully. Pets and children can be harmed if they find used or lost packages.  NOTE: This sheet is a summary. It may not cover all possible information. If you have questions about this medicine, talk to your doctor,  pharmacist, or health care provider.  NOTE:This sheet is a summary. It may not cover all possible information. If you have questions about this medicine, talk to your doctor, pharmacist, or health care provider. Copyright© 2016 [employer]    Ondansetron Oral disintegrating tablet  What is this medicine?  ONDANSETRON (on DAN se navid) is used to treat nausea and vomiting caused by chemotherapy. It is also used to prevent or treat nausea and vomiting after surgery.  This medicine may be used for other purposes; ask your health care provider or pharmacist if you have questions.  What should I tell my health care provider before I take this medicine?  They need to know if you have any of these conditions:  · heart disease  · history of irregular heartbeat  · liver disease  · low levels of magnesium or potassium in the blood  · an unusual or allergic reaction to ondansetron, granisetron, other medicines, foods, dyes, or preservatives  · pregnant or trying to get pregnant  · breast-feeding  How should I use this medicine?  These tablets are made to dissolve in the mouth. Do not try to push the tablet through the foil backing. With dry hands, peel away the foil backing and gently remove the tablet. Place the tablet in the mouth and allow it to dissolve, then swallow. While you may take these tablets with water, it is not necessary to do so.  Talk to your pediatrician regarding the use of this medicine in children. Special care may be needed.  Overdosage: If you think you have taken too much of this medicine contact a poison control center or emergency room at once.  NOTE: This medicine is only for you. Do not share this medicine with others.  What if I miss a dose?  If you miss a dose, take it as soon as you can. If it is almost time for your next dose, take only that dose. Do not take double or extra doses.  What may interact with this medicine?  Do not take this medicine with any of the following  medications:  · apomorphine  · certain medicines for fungal infections like fluconazole, itraconazole, ketoconazole, posaconazole, voriconazole  · cisapride  · dofetilide  · dronedarone  · pimozide  · thioridazine  · ziprasidone  This medicine may also interact with the following medications:  · carbamazepine  · certain medicines for depression, anxiety, or psychotic disturbances  · fentanyl  · linezolid  · MAOIs like Carbex, Eldepryl, Marplan, Nardil, and Parnate  · methylene blue (injected into a vein)  · other medicines that prolong the QT interval (cause an abnormal heart rhythm)  · phenytoin  · rifampicin  · tramadol  This list may not describe all possible interactions. Give your health care provider a list of all the medicines, herbs, non-prescription drugs, or dietary supplements you use. Also tell them if you smoke, drink alcohol, or use illegal drugs. Some items may interact with your medicine.  What should I watch for while using this medicine?  Check with your doctor or health care professional as soon as you can if you have any sign of an allergic reaction.  What side effects may I notice from receiving this medicine?  Side effects that you should report to your doctor or health care professional as soon as possible:  · allergic reactions like skin rash, itching or hives, swelling of the face, lips, or tongue  · breathing problems  · confusion  · dizziness  · fast or irregular heartbeat  · feeling faint or lightheaded, falls  · fever and chills  · loss of balance or coordination  · seizures  · sweating  · swelling of the hands and feet  · tightness in the chest  · tremors  · unusually weak or tired  Side effects that usually do not require medical attention (report to your doctor or health care professional if they continue or are bothersome):  · constipation or diarrhea  · headache  This list may not describe all possible side effects. Call your doctor for medical advice about side effects. You may  report side effects to FDA at 6-257-FDA-5179.  Where should I keep my medicine?  Keep out of the reach of children.  Store between 2 and 30 degrees C (36 and 86 degrees F). Throw away any unused medicine after the expiration date.  NOTE:This sheet is a summary. It may not cover all possible information. If you have questions about this medicine, talk to your doctor, pharmacist, or health care provider. Copyright© 2016 [employer]             Withheld/Decline to Answer

## 2024-07-14 LAB
A1C WITH ESTIMATED AVERAGE GLUCOSE RESULT: 6.2 % — HIGH (ref 4–5.6)
A1C WITH ESTIMATED AVERAGE GLUCOSE RESULT: 6.2 % — HIGH (ref 4–5.6)
ADD ON TEST-SPECIMEN IN LAB: SIGNIFICANT CHANGE UP
ANION GAP SERPL CALC-SCNC: 11 MMOL/L — SIGNIFICANT CHANGE UP (ref 5–17)
BASOPHILS # BLD AUTO: 0.01 K/UL — SIGNIFICANT CHANGE UP (ref 0–0.2)
BASOPHILS NFR BLD AUTO: 0.1 % — SIGNIFICANT CHANGE UP (ref 0–2)
BUN SERPL-MCNC: 17 MG/DL — SIGNIFICANT CHANGE UP (ref 7–23)
CALCIUM SERPL-MCNC: 9.4 MG/DL — SIGNIFICANT CHANGE UP (ref 8.4–10.5)
CHLORIDE SERPL-SCNC: 105 MMOL/L — SIGNIFICANT CHANGE UP (ref 96–108)
CO2 SERPL-SCNC: 24 MMOL/L — SIGNIFICANT CHANGE UP (ref 22–31)
CREAT SERPL-MCNC: 0.85 MG/DL — SIGNIFICANT CHANGE UP (ref 0.5–1.3)
EGFR: 98 ML/MIN/1.73M2 — SIGNIFICANT CHANGE UP
EOSINOPHIL # BLD AUTO: 0 K/UL — SIGNIFICANT CHANGE UP (ref 0–0.5)
EOSINOPHIL NFR BLD AUTO: 0 % — SIGNIFICANT CHANGE UP (ref 0–6)
ESTIMATED AVERAGE GLUCOSE: 131 MG/DL — HIGH (ref 68–114)
ESTIMATED AVERAGE GLUCOSE: 131 MG/DL — HIGH (ref 68–114)
GLUCOSE BLDC GLUCOMTR-MCNC: 126 MG/DL — HIGH (ref 70–99)
GLUCOSE SERPL-MCNC: 142 MG/DL — HIGH (ref 70–99)
HCT VFR BLD CALC: 43.5 % — SIGNIFICANT CHANGE UP (ref 39–50)
HGB BLD-MCNC: 14.9 G/DL — SIGNIFICANT CHANGE UP (ref 13–17)
IMM GRANULOCYTES NFR BLD AUTO: 0.5 % — SIGNIFICANT CHANGE UP (ref 0–0.9)
LYMPHOCYTES # BLD AUTO: 17 % — SIGNIFICANT CHANGE UP (ref 13–44)
LYMPHOCYTES # BLD AUTO: 2.03 K/UL — SIGNIFICANT CHANGE UP (ref 1–3.3)
MAGNESIUM SERPL-MCNC: 2.3 MG/DL — SIGNIFICANT CHANGE UP (ref 1.6–2.6)
MCHC RBC-ENTMCNC: 31.4 PG — SIGNIFICANT CHANGE UP (ref 27–34)
MCHC RBC-ENTMCNC: 34.3 GM/DL — SIGNIFICANT CHANGE UP (ref 32–36)
MCV RBC AUTO: 91.6 FL — SIGNIFICANT CHANGE UP (ref 80–100)
MONOCYTES # BLD AUTO: 0.1 K/UL — SIGNIFICANT CHANGE UP (ref 0–0.9)
MONOCYTES NFR BLD AUTO: 0.8 % — LOW (ref 2–14)
NEUTROPHILS # BLD AUTO: 9.77 K/UL — HIGH (ref 1.8–7.4)
NEUTROPHILS NFR BLD AUTO: 81.6 % — HIGH (ref 43–77)
NRBC # BLD: 0 /100 WBCS — SIGNIFICANT CHANGE UP (ref 0–0)
PHOSPHATE SERPL-MCNC: 4 MG/DL — SIGNIFICANT CHANGE UP (ref 2.5–4.5)
PLATELET # BLD AUTO: 354 K/UL — SIGNIFICANT CHANGE UP (ref 150–400)
POTASSIUM SERPL-MCNC: 4.1 MMOL/L — SIGNIFICANT CHANGE UP (ref 3.5–5.3)
POTASSIUM SERPL-SCNC: 4.1 MMOL/L — SIGNIFICANT CHANGE UP (ref 3.5–5.3)
RBC # BLD: 4.75 M/UL — SIGNIFICANT CHANGE UP (ref 4.2–5.8)
RBC # FLD: 12.4 % — SIGNIFICANT CHANGE UP (ref 10.3–14.5)
SODIUM SERPL-SCNC: 140 MMOL/L — SIGNIFICANT CHANGE UP (ref 135–145)
WBC # BLD: 11.97 K/UL — HIGH (ref 3.8–10.5)
WBC # FLD AUTO: 11.97 K/UL — HIGH (ref 3.8–10.5)

## 2024-07-14 PROCEDURE — 80053 COMPREHEN METABOLIC PANEL: CPT

## 2024-07-14 PROCEDURE — 96372 THER/PROPH/DIAG INJ SC/IM: CPT | Mod: XU

## 2024-07-14 PROCEDURE — 36415 COLL VENOUS BLD VENIPUNCTURE: CPT

## 2024-07-14 PROCEDURE — 86360 T CELL ABSOLUTE COUNT/RATIO: CPT

## 2024-07-14 PROCEDURE — 85027 COMPLETE CBC AUTOMATED: CPT

## 2024-07-14 PROCEDURE — 71045 X-RAY EXAM CHEST 1 VIEW: CPT

## 2024-07-14 PROCEDURE — 96374 THER/PROPH/DIAG INJ IV PUSH: CPT

## 2024-07-14 PROCEDURE — 85730 THROMBOPLASTIN TIME PARTIAL: CPT

## 2024-07-14 PROCEDURE — 85610 PROTHROMBIN TIME: CPT

## 2024-07-14 PROCEDURE — 96375 TX/PRO/DX INJ NEW DRUG ADDON: CPT

## 2024-07-14 PROCEDURE — 86850 RBC ANTIBODY SCREEN: CPT

## 2024-07-14 PROCEDURE — 87536 HIV-1 QUANT&REVRSE TRNSCRPJ: CPT

## 2024-07-14 PROCEDURE — 84100 ASSAY OF PHOSPHORUS: CPT

## 2024-07-14 PROCEDURE — 80048 BASIC METABOLIC PNL TOTAL CA: CPT

## 2024-07-14 PROCEDURE — 82962 GLUCOSE BLOOD TEST: CPT

## 2024-07-14 PROCEDURE — 83735 ASSAY OF MAGNESIUM: CPT

## 2024-07-14 PROCEDURE — 83036 HEMOGLOBIN GLYCOSYLATED A1C: CPT

## 2024-07-14 PROCEDURE — 99291 CRITICAL CARE FIRST HOUR: CPT

## 2024-07-14 PROCEDURE — 86901 BLOOD TYPING SEROLOGIC RH(D): CPT

## 2024-07-14 PROCEDURE — 85025 COMPLETE CBC W/AUTO DIFF WBC: CPT

## 2024-07-14 PROCEDURE — 86900 BLOOD TYPING SEROLOGIC ABO: CPT

## 2024-07-14 PROCEDURE — 84484 ASSAY OF TROPONIN QUANT: CPT

## 2024-07-14 PROCEDURE — 86160 COMPLEMENT ANTIGEN: CPT

## 2024-07-14 PROCEDURE — 96361 HYDRATE IV INFUSION ADD-ON: CPT

## 2024-07-14 PROCEDURE — 82550 ASSAY OF CK (CPK): CPT

## 2024-07-14 PROCEDURE — 99238 HOSP IP/OBS DSCHRG MGMT 30/<: CPT | Mod: GC

## 2024-07-14 PROCEDURE — 86359 T CELLS TOTAL COUNT: CPT

## 2024-07-14 PROCEDURE — 87040 BLOOD CULTURE FOR BACTERIA: CPT

## 2024-07-14 RX ORDER — METHYLPREDNISOLONE ACETATE 20 MG/ML
1 VIAL (ML) INJECTION
Qty: 4 | Refills: 0
Start: 2024-07-14

## 2024-07-14 RX ORDER — ACETAMINOPHEN 325 MG
650 TABLET ORAL ONCE
Refills: 0 | Status: COMPLETED | OUTPATIENT
Start: 2024-07-14 | End: 2024-07-14

## 2024-07-14 RX ORDER — LORATADINE 10 MG/1
1 TABLET ORAL
Qty: 30 | Refills: 2
Start: 2024-07-14 | End: 2024-10-11

## 2024-07-14 RX ADMIN — ENOXAPARIN SODIUM 40 MILLIGRAM(S): 100 INJECTION SUBCUTANEOUS at 07:14

## 2024-07-14 RX ADMIN — Medication 50 MILLIGRAM(S): at 05:48

## 2024-07-14 RX ADMIN — Medication 20 MILLIGRAM(S): at 10:49

## 2024-07-14 RX ADMIN — DEXAMETHASONE 8 MILLIGRAM(S): 1 TABLET ORAL at 05:48

## 2024-07-14 RX ADMIN — Medication 50 MILLIGRAM(S): at 13:15

## 2024-07-14 RX ADMIN — Medication 650 MILLIGRAM(S): at 15:17

## 2024-07-14 RX ADMIN — Medication 650 MILLIGRAM(S): at 16:12

## 2024-07-14 RX ADMIN — DEXAMETHASONE 8 MILLIGRAM(S): 1 TABLET ORAL at 13:16

## 2024-07-14 NOTE — DISCHARGE NOTE PROVIDER - HOSPITAL COURSE
#Discharge: do not delete    64-year-old male presented to [facility] with lip swelling that began this morning on 7/12 after taking his bedtime lisinopril w/o SOB, increased WOB, or stridor. Patient is being admitted for airway monitoring iso angioedema.    Problem List/Main Diagnoses (system-based):     CARDIOVASCULAR  #HTN  Chronic hx of HTN on home lisinopril 30mg for several years, had similar episode 2 months ago that resolved with benadryl  - d/c lisinopril iso angioedema  - PCP to start pt on new antihypertensive     PULM  #Angioedema  Likely secondary to his lisinopril that he has been taking for years, no prior hx of angioedema except for 2 moths ago. Did not have drooling, stridor, voice change or trouble breathing.   No NKA  ENT performed laryngoscopy on 7/13/24 and noted base of tongue fullness, moderate epiglottic edema, and moderate arytenoid edema bilaterally obscuring the posterior aspects of the vocal cords, however, remainder of VC visible and fully mobile.   - Medrol dose pack 4mg 3 day taper    - c/w Claritin 10 mg daily  - OP allergist f/u    ID  #HIV  Medication compliant, unclear which medications patient use  - c/w home HIV medications   - Medrec     Patient was discharged to: home    New medications: medrol dose pack 4 mg x 3 days, claritin 10 mg qd  Changes to old medications: None  Medications that were stopped: Lisinopril 30 mg     Items to follow up as outpatient: PCP, Allergist    Physical exam at the time of discharge:     GENERAL: NAD, lying in bed comfortably  HEAD:  Atraumatic, normocephalic  EYES: EOMI, PERRLA, conjunctiva and sclera clear  EARS, NOSE, THROAT: Lip swelling decreased, now at baseline. No pharyngeal edema.   NECK: Supple, trachea midline, no JVD  HEART: Regular rate and rhythm, no murmurs, rubs, or gallops  LUNGS: Unlabored respirations.  Clear to auscultation bilaterally, no crackles, wheezing, or rhonchi  ABDOMEN: Soft, nontender, nondistended, +BS  EXTREMITIES: 2+ peripheral pulses bilaterally. No clubbing, cyanosis, or edema  NERVOUS SYSTEM:  A&Ox3, moving all extremities, no focal deficits   SKIN: No rashes or lesions      LABS & STUDIES:

## 2024-07-14 NOTE — DISCHARGE NOTE NURSING/CASE MANAGEMENT/SOCIAL WORK - NSDCPEFALRISK_GEN_ALL_CORE
For information on Fall & Injury Prevention, visit: [email or web address] OR  [email or web address] OR  https://www.cdc.gov/steadi/patient.html

## 2024-07-14 NOTE — DISCHARGE NOTE PROVIDER - NSDCCPCAREPLAN_GEN_ALL_CORE_FT
no PRINCIPAL DISCHARGE DIAGNOSIS  Diagnosis: Angioedema  Assessment and Plan of Treatment: Angioedema is swelling beneath your skin. It can happen at many points on your body, including your: face, throat, larynx (your voice box), uvula (the little piece of skin that hangs from the back of your throat), arms, hands. legs or feet. It’s common to get hives at the same time.  The swelling feels like large, thick, firm welts and can cause redness, pain, or warmth in the swollen areas. If it’s in your lower intestine, it can bring stomach pain. Angioedema can be dangerous if swelling is in your throat or tongue. That can make it hard to breathe. If this happens, you should get medical help right away.   You came in because you had swelling of your lips and throat after taking your lisinopril. We stopped your lisinopril and asked you to follow up with an allergist and your primary care physician.      SECONDARY DISCHARGE DIAGNOSES  Diagnosis: Hypertension  Assessment and Plan of Treatment: You have a known history of high blood pressure prior to your admission. High blood pressure can cause damage to your heart and kidneys and increases your risk of heart attack and stroke. To avoid this, it is important that you continue to monitor your blood pressure when you are discharged. Additionally be sure to follow up with your primary care physician on a regular basis to make sure your blood pressure continues to be well controlled. If you experience symptoms such as but not limited to: sudden onset blurry vision, nausea, vomiting, chest pain, shortness of breath, or palpitations, please go to the nearest emergency room.  We discontinued your lisinopril, please follow up with your primary care physician to start a different medication for your blood pressure.      Diagnosis: HIV disease  Assessment and Plan of Treatment: Acquired immunodeficiency syndrome (AIDS), is an ongoing and chronic, condition. It's caused by the human immunodeficiency virus, also called HIV. HIV damages the immune system so that the body is less able to fight infection and disease. If HIV isn't treated, it can take years before it weakens the immune system enough to become AIDS. HIV is spread through contact with genitals, such as during sex without a condom. This type of infection is called a sexually transmitted infection, also called an STI. HIV also is spread through contact with blood, such as when people share needles or syringes.  There's no cure for HIV/AIDS, but medicines can control the infection and keep the disease from getting worse. Please continue to take your HIV medications as prescribed.

## 2024-07-14 NOTE — PATIENT PROFILE ADULT - FUNCTIONAL ASSESSMENT - DAILY ACTIVITY 5.
[facility] medication history for November 8, 2018 is complete.    See Epic admission navigator for prior to admission medications.   Operating room staff will still need to confirm medications and last dose information on day of surgery.     Medication history interview sources:  Patient Phone call    Changes made to PTA medication list (reason)  Added:   -- cefdinir patient patient     Deleted:   -- lipitor - haven't taken since June  -- lovenox - one month ppx course only  -- norco - no longer needing   -- lantus - haven't used since discharged from hospital  -- metformin -- haven't taken since June  -- Hyzaar - haven't taken since June    Changed: None    Additional medication history information (including reliability of information, actions taken by pharmacist):    -- No recent (within 30 days) course of steroids  -- Patient declines being on any other prescription or over-the-counter medications  -- patient has held her aspirin since 11/6/18 for her upcoming procedure on 11/13/18    Prior to Admission medications    Medication Sig Last Dose Taking? Auth Provider   cefdinir (OMNICEF) 300 MG capsule Take 300 mg by mouth 2 times daily Taking Yes Unknown, Entered By History   senna (SENOKOT) 8.6 MG tablet Take 1 tablet by mouth daily as needed for constipation Taking Yes Unknown, Entered By History   aspirin 81 MG chewable tablet Take 81 mg by mouth daily  Not Taking  Reported, Patient   ondansetron (ZOFRAN-ODT) 4 MG ODT tab DISSOLVE ONE TABLET BY MOUTH EVERY 4 HOURS AS NEEDED FOR NAUSEA Not Taking  Reported, Patient   prochlorperazine (COMPAZINE) 10 MG tablet 10 mg Not Taking  Reported, Patient         Medication history completed by: Augusto Shin RPH     4 = No assist / stand by assistance

## 2024-07-14 NOTE — PROGRESS NOTE ADULT - ATTENDING COMMENTS
Pt feels back to baseline. Will discontinue lisinopril and call PCP Dr. Junior regarding medication for BP control. Given Allergy/immunology appt and steroid taper. Can continue Claritin 10mg daily. Pt to return to ED if any difficulty breathing/swallowing or any other concerns  or return of edema.

## 2024-07-14 NOTE — DISCHARGE NOTE PROVIDER - NSDCMRMEDTOKEN_GEN_ALL_CORE_FT
acetaminophen 325 mg oral tablet: 3 tab(s) orally every 6 hours To be taken with Gabapentin MDD: 12 tabs  Claritin 10 mg oral tablet: 1 tab(s) orally once a day as needed for  allergy symptoms  cyclobenzaprine 10 mg oral tablet: 1 tab(s) orally 3 times a day as needed for  muscle spasm  gabapentin 300 mg oral capsule: 1 cap(s) orally 4 times a day as needed for  moderate pain to be taken with Tylenol for moderate pain  ibuprofen 600 mg oral tablet: 1 tab(s) orally every 6 hours as needed for  mild pain  levoFLOXacin 500 mg oral tablet: 1 tab(s) orally once a day   morphine 15 mg oral tablet: 1 tab(s) orally every 6 hours as needed for  severe pain TAKE ONLY FOR SEVERE PAIN IF UNTREATED BY OTHER PAIN MEDICATIONS PROSCRIBED, !HIGH ADDICTION POTENTIAL! MDD: 4   acetaminophen 325 mg oral tablet: 3 tab(s) orally every 6 hours To be taken with Gabapentin MDD: 12 tabs  Claritin 10 mg oral tablet: 1 tab(s) orally once a day as needed for  allergy symptoms  cyclobenzaprine 10 mg oral tablet: 1 tab(s) orally 3 times a day as needed for  muscle spasm  gabapentin 300 mg oral capsule: 1 cap(s) orally 4 times a day as needed for  moderate pain to be taken with Tylenol for moderate pain  ibuprofen 600 mg oral tablet: 1 tab(s) orally every 6 hours as needed for  mild pain  levoFLOXacin 500 mg oral tablet: 1 tab(s) orally once a day   MethylPREDNISolone Dose Pack 4 mg oral tablet: 1 cap(s) orally once a day On 7/15 take 2 tablets once, then on 7/16 take 1 tablet once, then on 7/17 take 1/2 tablet once  morphine 15 mg oral tablet: 1 tab(s) orally every 6 hours as needed for  severe pain TAKE ONLY FOR SEVERE PAIN IF UNTREATED BY OTHER PAIN MEDICATIONS PROSCRIBED, !HIGH ADDICTION POTENTIAL! MDD: 4

## 2024-07-14 NOTE — PROGRESS NOTE ADULT - ASSESSMENT
Assessment and Plan:  WENDI DALEY is a  64y Male with PMH HIV, HTN on lisinopril presents w/ angioedema after taking lisinopril. AFVSS on RA w/o increased WOB or stridor w/ laryngoscopy demonstrating base of tongue fullness, moderate epiglottic edema, and moderate arytenoid edema bilaterally obscuring the posterior aspects of the vocal cords, however, remainder of VC visible and mobile. Repeat laryngoscopy 7/14 AM w/ persistent base of tongue fullness/moderate epiglottic edema but improvement in arytenoid edema - now mild, better able to visualize posterior VC.    RECOMMENDATIONS:  - Ok to resume diet today  - Continue angioedema protocol  - Head of bed elevation  - Continuous pulse ox  - Presently intubatable w/ glidescope   - ENT to rescope in the afternoon, please call if develops increased WOB, stridor, voice changes, increased oxygen requirements    [facility] at [phone] with any questions/concerns.    Norma Cook  07-14-24 @ 10:59
Assessment and Plan:  WENDI DALEY is a  64y Male with PMH HIV, HTN on lisinopril presents w/ angioedema after taking lisinopril. AFVSS on RA w/o increased WOB or stridor w/ laryngoscopy demonstrating base of tongue fullness, moderate epiglottic edema, and moderate arytenoid edema bilaterally obscuring the posterior aspects of the vocal cords, however, remainder of VC visible and mobile. Repeat laryngoscopy 7/14 AM w/ persistent base of tongue fullness/moderate epiglottic edema but improvement in arytenoid edema - now mild, better able to visualize posterior VC. Repeat laryngoscopy 7/14 PM again w/ interval improvement from AM exam.    RECOMMENDATIONS:  - Regular diet  - Head of bed elevation  - Continuous pulse ox  - Angioedema/medical management per medicine team  - Presently intubatable  - No plan for rescope, please call if develops increased WOB, stridor, voice changes, increased oxygen requirements    [facility] at [phone] with any questions/concerns.    
Assessment: 64-year-old male [facility] with lip swellingthat began this morning at 10AM after taking his bedtime lisinopril. Patient is being admitted for airway monitoring iso angioedema.    NEURO / PSYCH  NA    CARDIOVASCULAR  #chest pain  #hx MI s/p stent  -high sens trop 4(wnl)  -home: aspirin 81 mg  []f/u ecg    PULM  #angioedema  Likely secondary to his lisinopril that he has been taking for years. Did not have drooling, stridor, voice change or trouble breathing.  [] Start Decardron 8mg q8h   []C/w Benadryl Q8   []Claritin 10 mg daily  []C/w Famotidine 20 mg IV   [] per ENT:  ·	base of tongue fullness, moderate epiglottic edema, and moderate arytenoid edema bilaterally obscuring the posterior aspects of the vocal cords, however, remainder of VC visible and fully mobile.   ·	NPO and continue pulse ox monitoring  []ENT re-scope in AM      GI  NPO given epiglottis edema  [] f/u AM  ENT scope    #HLD  -home: atorvastatin 30 mg daily      NA    ENDO  #pre-diabetes  -can't recall home medication he is on for weight loss and pre-diabetes    RENAL  NA    HEME  NA    ID  #leukocytosis  Reactive iso angioedema.   -10.25<-11.19  []CTM    #HIV  Medication compliant  -home: unknown  [] f/u cd4 count, viral load  []f/u [facility]    MSK  NA    PROPHYLAXIS  F: Replete as needed  E: Replete to K > 4, Mg > 2  GI PPX: pepcid  VTE PPX: scd  Access: peripheral IV  Code status: full code

## 2024-07-14 NOTE — DISCHARGE NOTE PROVIDER - CARE PROVIDER_API CALL
Fletcher Suarez  Allergy and Immunology  [facility] [place] [address], [address]  [place] [address]  Phone: [phone]  Fax: [phone]  Follow Up Time:

## 2024-07-14 NOTE — PROGRESS NOTE ADULT - SUBJECTIVE AND OBJECTIVE BOX
OVERNIGHT EVENTS:    SUBJECTIVE / INTERVAL HPI: Patient seen and examined at bedside.       PHYSICAL EXAM:    General: Lying comfortably and in no acute distress  HEENT: NC/AT; EOMI, anicteric sclera; MMM  Neck: supple  Cardiovascular: +S1/S2, RRR  Respiratory: CTA B/L; no W/R/R  Gastrointestinal: soft, NT/ND; +BSx4  Extremities: WWP; no edema, clubbing or cyanosis  Vascular: 2+ radial pulses B/L  Neurological: AAOx3; no focal deficits  Psychiatric: pleasant mood and affect  Dermatologic: no appreciable wounds or damage to the skin    VITAL SIGNS:  Vital Signs Last 24 Hrs  T(C): 36.5 (14 Jul 2024 05:30), Max: 37.2 (14 Jul 2024 00:57)  T(F): 97.7 (14 Jul 2024 05:30), Max: 98.9 (14 Jul 2024 00:57)  HR: 62 (14 Jul 2024 08:21) (62 - 80)  BP: 143/86 (14 Jul 2024 08:21) (113/69 - 156/85)  BP(mean): 109 (14 Jul 2024 08:21) (86 - 109)  RR: 18 (14 Jul 2024 08:21) (16 - 19)  SpO2: 92% (14 Jul 2024 08:21) (92% - 98%)    Parameters below as of 14 Jul 2024 08:21  Patient On (Oxygen Delivery Method): room air          MEDICATIONS:  MEDICATIONS  (STANDING):  dexAMETHasone  Injectable 8 milliGRAM(s) IV Push every 8 hours  dextrose 5%. 1000 milliLiter(s) (50 mL/Hr) IV Continuous <Continuous>  dextrose 5%. 1000 milliLiter(s) (100 mL/Hr) IV Continuous <Continuous>  dextrose 50% Injectable 25 Gram(s) IV Push once  dextrose 50% Injectable 25 Gram(s) IV Push once  dextrose 50% Injectable 12.5 Gram(s) IV Push once  diphenhydrAMINE Injectable 50 milliGRAM(s) IV Push every 8 hours  enoxaparin Injectable 40 milliGRAM(s) SubCutaneous every 24 hours  famotidine Injectable 20 milliGRAM(s) IV Push two times a day  glucagon  Injectable 1 milliGRAM(s) IntraMuscular once    MEDICATIONS  (PRN):  dextrose Oral Gel 15 Gram(s) Oral once PRN Blood Glucose LESS THAN 70 milliGRAM(s)/deciliter      ALLERGIES:  Allergies    No Known Allergies    Intolerances        LABS:                        14.9   11.97 )-----------( 354      ( 14 Jul 2024 05:30 )             43.5     07-14    140  |  105  |  17  ----------------------------<  142<H>  4.1   |  24  |  0.85    Ca    9.4      14 Jul 2024 05:30  Phos  4.0     07-14  Mg     2.3     07-14    TPro  8.1  /  Alb  4.0  /  TBili  0.2  /  DBili  x   /  AST  17  /  ALT  16  /  AlkPhos  73  07-13    PT/INR - ( 13 Jul 2024 20:22 )   PT: 10.2 sec;   INR: 0.89          PTT - ( 13 Jul 2024 20:22 )  PTT:32.9 sec  Urinalysis Basic - ( 14 Jul 2024 05:30 )    Color: x / Appearance: x / SG: x / pH: x  Gluc: 142 mg/dL / Ketone: x  / Bili: x / Urobili: x   Blood: x / Protein: x / Nitrite: x   Leuk Esterase: x / RBC: x / WBC x   Sq Epi: x / Non Sq Epi: x / Bacteria: x      CAPILLARY BLOOD GLUCOSE      POCT Blood Glucose.: 126 mg/dL (14 Jul 2024 06:50)      RADIOLOGY & ADDITIONAL TESTS: Reviewed. OVERNIGHT EVENTS:    SUBJECTIVE / INTERVAL HPI: Patient seen and examined at bedside.       PHYSICAL EXAM:    General: Lying comfortably and in no acute distress. Denies trouble swallowing or handling secretions.  HEENT: NC/AT; EOMI, anicteric sclera; MMM. Lip edema markedly improved. Uvula seen on mouth opening and no edema or tongue swelling.  Neck: supple, no stridor.  Cardiovascular: +S1/S2, RRR  Respiratory: CTA B/L; no W/R/R  Gastrointestinal: soft, NT/ND; +BSx4  Extremities: WWP; no edema, clubbing or cyanosis  Vascular: 2+ radial pulses B/L  Neurological: AAOx3; no focal deficits  Psychiatric: pleasant mood and affect  Dermatologic: no appreciable wounds or damage to the skin    VITAL SIGNS:  Vital Signs Last 24 Hrs  T(C): 36.5 (14 Jul 2024 05:30), Max: 37.2 (14 Jul 2024 00:57)  T(F): 97.7 (14 Jul 2024 05:30), Max: 98.9 (14 Jul 2024 00:57)  HR: 62 (14 Jul 2024 08:21) (62 - 80)  BP: 143/86 (14 Jul 2024 08:21) (113/69 - 156/85)  BP(mean): 109 (14 Jul 2024 08:21) (86 - 109)  RR: 18 (14 Jul 2024 08:21) (16 - 19)  SpO2: 92% (14 Jul 2024 08:21) (92% - 98%)    Parameters below as of 14 Jul 2024 08:21  Patient On (Oxygen Delivery Method): room air          MEDICATIONS:  MEDICATIONS  (STANDING):  dexAMETHasone  Injectable 8 milliGRAM(s) IV Push every 8 hours  dextrose 5%. 1000 milliLiter(s) (50 mL/Hr) IV Continuous <Continuous>  dextrose 5%. 1000 milliLiter(s) (100 mL/Hr) IV Continuous <Continuous>  dextrose 50% Injectable 25 Gram(s) IV Push once  dextrose 50% Injectable 25 Gram(s) IV Push once  dextrose 50% Injectable 12.5 Gram(s) IV Push once  diphenhydrAMINE Injectable 50 milliGRAM(s) IV Push every 8 hours  enoxaparin Injectable 40 milliGRAM(s) SubCutaneous every 24 hours  famotidine Injectable 20 milliGRAM(s) IV Push two times a day  glucagon  Injectable 1 milliGRAM(s) IntraMuscular once    MEDICATIONS  (PRN):  dextrose Oral Gel 15 Gram(s) Oral once PRN Blood Glucose LESS THAN 70 milliGRAM(s)/deciliter      ALLERGIES:  Allergies    No Known Allergies    Intolerances        LABS:                        14.9   11.97 )-----------( 354      ( 14 Jul 2024 05:30 )             43.5     07-14    140  |  105  |  17  ----------------------------<  142<H>  4.1   |  24  |  0.85    Ca    9.4      14 Jul 2024 05:30  Phos  4.0     07-14  Mg     2.3     07-14    TPro  8.1  /  Alb  4.0  /  TBili  0.2  /  DBili  x   /  AST  17  /  ALT  16  /  AlkPhos  73  07-13    PT/INR - ( 13 Jul 2024 20:22 )   PT: 10.2 sec;   INR: 0.89          PTT - ( 13 Jul 2024 20:22 )  PTT:32.9 sec  Urinalysis Basic - ( 14 Jul 2024 05:30 )    Color: x / Appearance: x / SG: x / pH: x  Gluc: 142 mg/dL / Ketone: x  / Bili: x / Urobili: x   Blood: x / Protein: x / Nitrite: x   Leuk Esterase: x / RBC: x / WBC x   Sq Epi: x / Non Sq Epi: x / Bacteria: x      CAPILLARY BLOOD GLUCOSE      POCT Blood Glucose.: 126 mg/dL (14 Jul 2024 06:50)      RADIOLOGY & ADDITIONAL TESTS: Reviewed.

## 2024-07-14 NOTE — PROGRESS NOTE ADULT - SUBJECTIVE AND OBJECTIVE BOX
OTOLARYNGOLOGY (ENT) PROGRESS NOTE    PATIENT: WENDI DALEY  MRN: [id number]  : 64  [id number]  DATE OF SERVICE:  24  			           Subjective/ Interval: Patient seen and examined at bedside this morning. AFVSS. He reports facial swelling slightly improved. He denies difficulty breathing, throat tightness, voice changes, odynophagia/dysphagia.    ALLERGIES:  No Known Allergies      MEDICATIONS:  Antiinfectives:     IV fluids:  dextrose 5%. 1000 milliLiter(s) IV Continuous <Continuous>  dextrose 5%. 1000 milliLiter(s) IV Continuous <Continuous>    Hematologic/Anticoagulation:  enoxaparin Injectable 40 milliGRAM(s) SubCutaneous every 24 hours    Pain medications/Neuro:    Endocrine Medications:   dexAMETHasone  Injectable 8 milliGRAM(s) IV Push every 8 hours  dextrose 50% Injectable 25 Gram(s) IV Push once  dextrose 50% Injectable 25 Gram(s) IV Push once  dextrose 50% Injectable 12.5 Gram(s) IV Push once  dextrose Oral Gel 15 Gram(s) Oral once PRN  glucagon  Injectable 1 milliGRAM(s) IntraMuscular once    All other standing medications:   diphenhydrAMINE Injectable 50 milliGRAM(s) IV Push every 8 hours  famotidine Injectable 20 milliGRAM(s) IV Push two times a day    All other PRN medications:    Vital Signs Last 24 Hrs  T(C): 37.1 (2024 10:53), Max: 37.2 (2024 00:57)  T(F): 98.8 (2024 10:53), Max: 98.9 (2024 00:57)  HR: 62 (2024 08:21) (62 - 80)  BP: 143/86 (2024 08:21) (113/69 - 156/85)  BP(mean): 109 (2024 08:21) (86 - 109)  RR: 18 (2024 08:21) (16 - 19)  SpO2: 92% (2024 08:21) (92% - 98%)    Parameters below as of 2024 08:21  Patient On (Oxygen Delivery Method): room air          -13 @ 07:01  -  -14 @ 07:00  --------------------------------------------------------  IN:    IV PiggyBack: 650 mL  Total IN: 650 mL    OUT:    Voided (mL): 350 mL  Total OUT: 350 mL    Total NET: 300 mL              PHYSICAL EXAM:  General: NAD, A+Ox3  Respiratory: No respiratory distress, stridor, or stertor on room air  Voice quality: normal  Face:  Symmetric, left sided buccal fullness, upper lip edema L>R - mildly improved from yesterday, lower lip edema present but less significant than upper lip  Right: Pinna wnl  Left: Pinna wnl  Nose: nasal cavity clear bilaterally, inferior turbinates normal, mucosa normal without crusting or bleeding  OC/OP: tongue normal, floor of mouth WNL and soft, no masses or lesions, uvula midline w/o edema, 2+ tonsils  Neck: soft/flat, no LAD    LARYNGOSCOPY EXAM:     Verbal consent was obtained from patient prior to procedure.    Indication: angioedema    Flexible laryngoscopy was performed and revealed the following:    Nasopharynx had no mass or exudate.    Base of tongue symmetric and edematous bilaterally    Epiglottis omega shaped and w/ moderate edema    Moderate edema of the bilateral arytenoids (R>L) partially obscuring the posterior aspect of the BL VC - mildly improved, better able to visualize posterior aspect of VC    BL VC visible and mobile    Mild post cricoid edema    The patient tolerated the procedure well.               LABS                       14.9   11.97 )-----------( 354      ( 2024 05:30 )             43.5    07-14    140  |  105  |  17  ----------------------------<  142<H>  4.1   |  24  |  0.85    Ca    9.4      2024 05:30  Phos  4.0     07-14  Mg     2.3     07-14    TPro  8.1  /  Alb  4.0  /  TBili  0.2  /  DBili  x   /  AST  17  /  ALT  16  /  AlkPhos  73  07-13         Coagulation Studies-   PT/INR - ( 2024 20:22 )   PT: 10.2 sec;   INR: 0.89          PTT - ( 2024 20:22 )  PTT:32.9 sec  Urinalysis Basic - ( 2024 05:30 )    Color: x / Appearance: x / SG: x / pH: x  Gluc: 142 mg/dL / Ketone: x  / Bili: x / Urobili: x   Blood: x / Protein: x / Nitrite: x   Leuk Esterase: x / RBC: x / WBC x   Sq Epi: x / Non Sq Epi: x / Bacteria: x      Endocrine Panel-  Calcium: 9.4 mg/dL ( @ 05:30)  Calcium: 9.5 mg/dL ( @ 20:22)  Calcium: 8.9 mg/dL ( @ 17:50)                MICROBIOLOGY:        RADIOLOGY & ADDITIONAL STUDIES:

## 2024-07-14 NOTE — PATIENT PROFILE ADULT - FALL HARM RISK - HARM RISK INTERVENTIONS

## 2024-07-14 NOTE — DISCHARGE NOTE NURSING/CASE MANAGEMENT/SOCIAL WORK - PATIENT PORTAL LINK FT
You can access the FollowMyHealth Patient Portal offered by [facility] by registering at the following website: [email or web address]. By joining [employer]’s FollowMyHealth portal, you will also be able to view your health information using other applications (apps) compatible with our system.

## 2024-07-15 VITALS — TEMPERATURE: 99 F

## 2024-07-15 LAB
4/8 RATIO: 1.81 RATIO — SIGNIFICANT CHANGE UP (ref 0.9–3.6)
ABS CD8: 223 CELLS/UL — SIGNIFICANT CHANGE UP (ref 142–740)
C4 SERPL-MCNC: 32 MG/DL — SIGNIFICANT CHANGE UP (ref 13–39)
CD3 BLASTS SPEC-ACNC: 40 % — LOW (ref 59–83)
CD3 BLASTS SPEC-ACNC: 639 CELLS/UL — LOW (ref 672–1870)
CD4 %: 25 % — LOW (ref 30–62)
CD8 %: 14 % — SIGNIFICANT CHANGE UP (ref 12–36)
HIV-1 VIRAL LOAD RESULT: SIGNIFICANT CHANGE UP
HIV1 RNA # SERPL NAA+PROBE: SIGNIFICANT CHANGE UP COPIES/ML
HIV1 RNA SER-IMP: SIGNIFICANT CHANGE UP
HIV1 RNA SERPL NAA+PROBE-ACNC: SIGNIFICANT CHANGE UP
HIV1 RNA SERPL NAA+PROBE-LOG#: SIGNIFICANT CHANGE UP LG COP/ML
T-CELL CD4 SUBSET PNL BLD: 403 CELLS/UL — LOW (ref 489–1457)

## 2024-07-19 LAB
CULTURE RESULTS: SIGNIFICANT CHANGE UP
SPECIMEN SOURCE: SIGNIFICANT CHANGE UP

## 2024-07-22 DIAGNOSIS — R07.9 CHEST PAIN, UNSPECIFIED: ICD-10-CM

## 2024-07-22 DIAGNOSIS — Z79.899 OTHER LONG TERM (CURRENT) DRUG THERAPY: ICD-10-CM

## 2024-07-22 DIAGNOSIS — Z95.5 PRESENCE OF CORONARY ANGIOPLASTY IMPLANT AND GRAFT: ICD-10-CM

## 2024-07-22 DIAGNOSIS — Z79.1 LONG TERM (CURRENT) USE OF NON-STEROIDAL ANTI-INFLAMMATORIES (NSAID): ICD-10-CM

## 2024-07-22 DIAGNOSIS — R73.03 PREDIABETES: ICD-10-CM

## 2024-07-22 DIAGNOSIS — B20 HUMAN IMMUNODEFICIENCY VIRUS [HIV] DISEASE: ICD-10-CM

## 2024-07-22 DIAGNOSIS — E78.00 PURE HYPERCHOLESTEROLEMIA, UNSPECIFIED: ICD-10-CM

## 2024-07-22 DIAGNOSIS — F10.90 ALCOHOL USE, UNSPECIFIED, UNCOMPLICATED: ICD-10-CM

## 2024-07-22 DIAGNOSIS — D72.829 ELEVATED WHITE BLOOD CELL COUNT, UNSPECIFIED: ICD-10-CM

## 2024-07-22 DIAGNOSIS — T78.3XXA ANGIONEUROTIC EDEMA, INITIAL ENCOUNTER: ICD-10-CM

## 2024-07-22 DIAGNOSIS — I10 ESSENTIAL (PRIMARY) HYPERTENSION: ICD-10-CM

## 2024-07-22 DIAGNOSIS — F12.90 CANNABIS USE, UNSPECIFIED, UNCOMPLICATED: ICD-10-CM

## 2024-07-22 DIAGNOSIS — T46.4X5A ADVERSE EFFECT OF ANGIOTENSIN-CONVERTING-ENZYME INHIBITORS, INITIAL ENCOUNTER: ICD-10-CM

## 2024-07-22 DIAGNOSIS — I25.2 OLD MYOCARDIAL INFARCTION: ICD-10-CM
